# Patient Record
Sex: FEMALE | Race: WHITE | NOT HISPANIC OR LATINO | URBAN - METROPOLITAN AREA
[De-identification: names, ages, dates, MRNs, and addresses within clinical notes are randomized per-mention and may not be internally consistent; named-entity substitution may affect disease eponyms.]

---

## 2018-02-24 ENCOUNTER — EMERGENCY (EMERGENCY)
Facility: HOSPITAL | Age: 73
LOS: 1 days | Discharge: HOME | End: 2018-02-24
Attending: EMERGENCY MEDICINE | Admitting: EMERGENCY MEDICINE
Payer: MEDICARE

## 2018-02-24 VITALS
OXYGEN SATURATION: 99 % | RESPIRATION RATE: 16 BRPM | TEMPERATURE: 98 F | DIASTOLIC BLOOD PRESSURE: 72 MMHG | SYSTOLIC BLOOD PRESSURE: 145 MMHG | HEART RATE: 68 BPM

## 2018-02-24 VITALS
HEART RATE: 96 BPM | SYSTOLIC BLOOD PRESSURE: 151 MMHG | RESPIRATION RATE: 16 BRPM | HEIGHT: 60 IN | DIASTOLIC BLOOD PRESSURE: 76 MMHG | OXYGEN SATURATION: 99 % | WEIGHT: 203.93 LBS | TEMPERATURE: 97 F

## 2018-02-24 LAB
ALBUMIN SERPL ELPH-MCNC: 4.2 G/DL — SIGNIFICANT CHANGE UP (ref 3.3–5)
ALP SERPL-CCNC: 54 U/L — SIGNIFICANT CHANGE UP (ref 40–120)
ALT FLD-CCNC: 20 U/L RC — SIGNIFICANT CHANGE UP (ref 10–45)
ANION GAP SERPL CALC-SCNC: 14 MMOL/L — SIGNIFICANT CHANGE UP (ref 5–17)
APPEARANCE UR: CLEAR — SIGNIFICANT CHANGE UP
AST SERPL-CCNC: 23 U/L — SIGNIFICANT CHANGE UP (ref 10–40)
BACTERIA # UR AUTO: ABNORMAL /HPF
BASOPHILS # BLD AUTO: 0 K/UL — SIGNIFICANT CHANGE UP (ref 0–0.2)
BASOPHILS NFR BLD AUTO: 0.3 % — SIGNIFICANT CHANGE UP (ref 0–2)
BILIRUB SERPL-MCNC: 0.4 MG/DL — SIGNIFICANT CHANGE UP (ref 0.2–1.2)
BILIRUB UR-MCNC: NEGATIVE — SIGNIFICANT CHANGE UP
BUN SERPL-MCNC: 13 MG/DL — SIGNIFICANT CHANGE UP (ref 7–23)
CALCIUM SERPL-MCNC: 9.9 MG/DL — SIGNIFICANT CHANGE UP (ref 8.4–10.5)
CHLORIDE SERPL-SCNC: 102 MMOL/L — SIGNIFICANT CHANGE UP (ref 96–108)
CO2 SERPL-SCNC: 24 MMOL/L — SIGNIFICANT CHANGE UP (ref 22–31)
COLOR SPEC: SIGNIFICANT CHANGE UP
CREAT SERPL-MCNC: 0.83 MG/DL — SIGNIFICANT CHANGE UP (ref 0.5–1.3)
DIFF PNL FLD: NEGATIVE — SIGNIFICANT CHANGE UP
EOSINOPHIL # BLD AUTO: 0.1 K/UL — SIGNIFICANT CHANGE UP (ref 0–0.5)
EOSINOPHIL NFR BLD AUTO: 1.3 % — SIGNIFICANT CHANGE UP (ref 0–6)
EPI CELLS # UR: SIGNIFICANT CHANGE UP /HPF
GLUCOSE SERPL-MCNC: 94 MG/DL — SIGNIFICANT CHANGE UP (ref 70–99)
GLUCOSE UR QL: NEGATIVE — SIGNIFICANT CHANGE UP
HCT VFR BLD CALC: 40.2 % — SIGNIFICANT CHANGE UP (ref 34.5–45)
HGB BLD-MCNC: 14 G/DL — SIGNIFICANT CHANGE UP (ref 11.5–15.5)
KETONES UR-MCNC: NEGATIVE — SIGNIFICANT CHANGE UP
LEUKOCYTE ESTERASE UR-ACNC: NEGATIVE — SIGNIFICANT CHANGE UP
LIDOCAIN IGE QN: 19 U/L — SIGNIFICANT CHANGE UP (ref 7–60)
LYMPHOCYTES # BLD AUTO: 1.8 K/UL — SIGNIFICANT CHANGE UP (ref 1–3.3)
LYMPHOCYTES # BLD AUTO: 25.7 % — SIGNIFICANT CHANGE UP (ref 13–44)
MCHC RBC-ENTMCNC: 33.1 PG — SIGNIFICANT CHANGE UP (ref 27–34)
MCHC RBC-ENTMCNC: 34.8 GM/DL — SIGNIFICANT CHANGE UP (ref 32–36)
MCV RBC AUTO: 95.2 FL — SIGNIFICANT CHANGE UP (ref 80–100)
MONOCYTES # BLD AUTO: 0.5 K/UL — SIGNIFICANT CHANGE UP (ref 0–0.9)
MONOCYTES NFR BLD AUTO: 7.1 % — SIGNIFICANT CHANGE UP (ref 2–14)
NEUTROPHILS # BLD AUTO: 4.6 K/UL — SIGNIFICANT CHANGE UP (ref 1.8–7.4)
NEUTROPHILS NFR BLD AUTO: 65.5 % — SIGNIFICANT CHANGE UP (ref 43–77)
NITRITE UR-MCNC: NEGATIVE — SIGNIFICANT CHANGE UP
PH UR: 6.5 — SIGNIFICANT CHANGE UP (ref 5–8)
PLATELET # BLD AUTO: 285 K/UL — SIGNIFICANT CHANGE UP (ref 150–400)
POTASSIUM SERPL-MCNC: 4.6 MMOL/L — SIGNIFICANT CHANGE UP (ref 3.5–5.3)
POTASSIUM SERPL-SCNC: 4.6 MMOL/L — SIGNIFICANT CHANGE UP (ref 3.5–5.3)
PROT SERPL-MCNC: 7.7 G/DL — SIGNIFICANT CHANGE UP (ref 6–8.3)
PROT UR-MCNC: NEGATIVE — SIGNIFICANT CHANGE UP
RBC # BLD: 4.23 M/UL — SIGNIFICANT CHANGE UP (ref 3.8–5.2)
RBC # FLD: 11.7 % — SIGNIFICANT CHANGE UP (ref 10.3–14.5)
RBC CASTS # UR COMP ASSIST: SIGNIFICANT CHANGE UP /HPF (ref 0–2)
SODIUM SERPL-SCNC: 140 MMOL/L — SIGNIFICANT CHANGE UP (ref 135–145)
SP GR SPEC: >1.03 — HIGH (ref 1.01–1.02)
UROBILINOGEN FLD QL: NEGATIVE — SIGNIFICANT CHANGE UP
WBC # BLD: 7 K/UL — SIGNIFICANT CHANGE UP (ref 3.8–10.5)
WBC # FLD AUTO: 7 K/UL — SIGNIFICANT CHANGE UP (ref 3.8–10.5)
WBC UR QL: SIGNIFICANT CHANGE UP /HPF (ref 0–5)

## 2018-02-24 PROCEDURE — 74177 CT ABD & PELVIS W/CONTRAST: CPT | Mod: 26

## 2018-02-24 PROCEDURE — 74177 CT ABD & PELVIS W/CONTRAST: CPT

## 2018-02-24 PROCEDURE — 80053 COMPREHEN METABOLIC PANEL: CPT

## 2018-02-24 PROCEDURE — 99284 EMERGENCY DEPT VISIT MOD MDM: CPT | Mod: 25

## 2018-02-24 PROCEDURE — 85027 COMPLETE CBC AUTOMATED: CPT

## 2018-02-24 PROCEDURE — 76705 ECHO EXAM OF ABDOMEN: CPT | Mod: 26

## 2018-02-24 PROCEDURE — 93005 ELECTROCARDIOGRAM TRACING: CPT

## 2018-02-24 PROCEDURE — 83690 ASSAY OF LIPASE: CPT

## 2018-02-24 PROCEDURE — 81001 URINALYSIS AUTO W/SCOPE: CPT

## 2018-02-24 PROCEDURE — 99284 EMERGENCY DEPT VISIT MOD MDM: CPT

## 2018-02-24 PROCEDURE — 76705 ECHO EXAM OF ABDOMEN: CPT

## 2018-02-24 PROCEDURE — 96374 THER/PROPH/DIAG INJ IV PUSH: CPT

## 2018-02-24 RX ORDER — ACETAMINOPHEN 500 MG
1000 TABLET ORAL ONCE
Qty: 0 | Refills: 0 | Status: COMPLETED | OUTPATIENT
Start: 2018-02-24 | End: 2018-02-24

## 2018-02-24 RX ORDER — SODIUM CHLORIDE 9 MG/ML
1000 INJECTION INTRAMUSCULAR; INTRAVENOUS; SUBCUTANEOUS ONCE
Qty: 0 | Refills: 0 | Status: COMPLETED | OUTPATIENT
Start: 2018-02-24 | End: 2018-02-24

## 2018-02-24 RX ADMIN — Medication 1000 MILLIGRAM(S): at 18:55

## 2018-02-24 RX ADMIN — Medication 400 MILLIGRAM(S): at 17:17

## 2018-02-24 RX ADMIN — SODIUM CHLORIDE 1000 MILLILITER(S): 9 INJECTION INTRAMUSCULAR; INTRAVENOUS; SUBCUTANEOUS at 17:17

## 2018-02-24 NOTE — ED PROVIDER NOTE - PROGRESS NOTE DETAILS
Pt signed out to me by Dr. He pending results of ct scan. pt describes resolution of abdominal pain at this time. ct scan is remarkable for left adrenal nodules that require no emergent treatment at this time. recommendation given to pt and sister for follow up with primary care doctor for outpatient MRI. UA is pending. will follow up with pt and d/c home following arrival of ua. Pt signed out to me by Dr. He pending results of ct scan. pt describes resolution of abdominal pain at this time. ct scan is remarkable for left adrenal nodules that require no emergent treatment at this time. recommendation given to pt and sister for follow up with primary care doctor for outpatient MRI. UA is pending. will follow up with pt and d/c home following arrival of ua.  -Dr. Rosales

## 2018-02-24 NOTE — ED PROVIDER NOTE - MEDICAL DECISION MAKING DETAILS
eval for cholelithiasis/cholecystitis with RUQ, however, given pt reports diffuse pain and recently weight loss will additionally pursue CT for alternate dx.  obtain labs including LFTs, lipase.  pt ttp with +murrell's less likely cardiac etiology, will obtain ekg.  analgesia, reassess

## 2018-02-24 NOTE — ED ADULT NURSE NOTE - OBJECTIVE STATEMENT
72 year old female presents to ED c/o abd pain x 1 month, went to PMD and u/s showed gall bladder stones. Patient states she has decreased appetite.  Lung sounds clear. Abd slightly distended tender to RUQ. Skin warm dry intact. Family at bedside. in no acute distress.

## 2018-02-24 NOTE — ED ADULT NURSE REASSESSMENT NOTE - NS ED NURSE REASSESS COMMENT FT1
1900: report received from diana victoria. pt pending urine sample and ct results for dispo. pt denies abd pain/n/v/dysuria/numbness/tingling/chest pain/sob. abd nondistended, nontender. aox3. family at bedside. safety maintained. will continue to monitor.

## 2018-02-24 NOTE — ED PROVIDER NOTE - OBJECTIVE STATEMENT
72 year old female with pmhx of Hypertension, hyperlipidemia presents from PMD's office with epigastric, RUQ abdominal pain. Currently associated with nausea.  Recently diagnosed with cholelithiasis  Post-cranial. Has multiple gall stones seen on abdominal sonogram. Has chills but no fever. LNBM this morning. Per patient, last night noted slight blood in stools when 72 year old female with pmhx of Hypertension, hyperlipidemia presents from PMD's office with diffuse abdominal pain worse in epigastric, RUQ abdominal pain for weeks. Currently associated with nausea.  Recently diagnosed with cholelithiasis  Postprandial. Has multiple gall stones seen on abdominal sonogram from 3 weeks ago. Sent by PMD to r/o cholecystitis.  Has chills but no fever. BM this morning, no melena.  Sister notes recent weight loss.

## 2019-01-14 PROBLEM — Z00.00 ENCOUNTER FOR PREVENTIVE HEALTH EXAMINATION: Status: ACTIVE | Noted: 2019-01-14

## 2019-02-13 ENCOUNTER — APPOINTMENT (OUTPATIENT)
Dept: VASCULAR SURGERY | Facility: CLINIC | Age: 74
End: 2019-02-13
Payer: MEDICARE

## 2019-02-13 VITALS
SYSTOLIC BLOOD PRESSURE: 137 MMHG | HEIGHT: 61 IN | DIASTOLIC BLOOD PRESSURE: 82 MMHG | WEIGHT: 208 LBS | TEMPERATURE: 99.8 F | BODY MASS INDEX: 39.27 KG/M2

## 2019-02-13 PROCEDURE — 99204 OFFICE O/P NEW MOD 45 MIN: CPT

## 2019-02-13 PROCEDURE — 93923 UPR/LXTR ART STDY 3+ LVLS: CPT

## 2019-02-13 RX ORDER — CILOSTAZOL 50 MG/1
50 TABLET ORAL
Qty: 60 | Refills: 6 | Status: ACTIVE | COMMUNITY
Start: 2019-02-13 | End: 1900-01-01

## 2019-02-13 RX ORDER — VITAMIN B COMPLEX
CAPSULE ORAL
Refills: 0 | Status: ACTIVE | COMMUNITY

## 2019-02-13 RX ORDER — NABUMETONE 500 MG/1
500 TABLET, FILM COATED ORAL
Refills: 0 | Status: ACTIVE | COMMUNITY

## 2019-02-13 RX ORDER — UBIDECARENONE 50 MG
CAPSULE ORAL
Refills: 0 | Status: ACTIVE | COMMUNITY

## 2019-02-13 RX ORDER — OMEPRAZOLE 40 MG/1
40 CAPSULE, DELAYED RELEASE ORAL
Refills: 0 | Status: ACTIVE | COMMUNITY

## 2019-02-13 RX ORDER — SIMVASTATIN 10 MG/1
10 TABLET, FILM COATED ORAL
Refills: 0 | Status: ACTIVE | COMMUNITY

## 2019-02-13 RX ORDER — CALCIUM CARBONATE 500(1250)
500 TABLET ORAL
Refills: 0 | Status: ACTIVE | COMMUNITY

## 2019-02-13 NOTE — DATA REVIEWED
[FreeTextEntry1] : 2/13/2019 BARTOLOME/PVR RLE mild infra geniculate and LLE mild infra geniculate arterial insuff w vessel calcification\par                                 Rt BARTOLOME  1.22 Lt BARTOLOME  1.06\par                                 \par

## 2019-02-13 NOTE — PHYSICAL EXAM
[Normal Breath Sounds] : Normal breath sounds [2+] : left 2+ [1+] : left 1+ [Ankle Swelling (On Exam)] : present [Ankle Swelling Bilaterally] : bilaterally  [Varicose Veins Of Lower Extremities] : bilaterally [] : bilaterally [Ankle Swelling On The Right] : mild [No HSM] : no hepatosplenomegaly [No Rash or Lesion] : No rash or lesion [Alert] : alert [Oriented to Person] : oriented to person [Oriented to Place] : oriented to place [Oriented to Time] : oriented to time [Calm] : calm [JVD] : no jugular venous distention  [Right Carotid Bruit] : no bruit heard over the right carotid [Left Carotid Bruit] : no bruit heard over the left carotid [Abdomen Masses] : No abdominal masses [Tender] : was nontender [Stool Sample Taken] : No stool obtained  on rectal exam [de-identified] : nad [de-identified] : wnl [FreeTextEntry1] : Mod arterial insufficiency w moderate trophic skin changes \par no wounds/ulcers\par Mild bilateral leg venous insufficiency \par w mild bilateral leg stasis dermatitis \par and mild to moderate bilateral leg edema \par Multiple  bilateral leg small varicose  veins and spider veins calf and shin \par no wounds/ulcers\par  [de-identified] : wnl [de-identified] : wnl [de-identified] : Brian Cranial nerves 2-12 brian grossly intact [de-identified] : cooperative

## 2019-02-13 NOTE — HISTORY OF PRESENT ILLNESS
[FreeTextEntry1] : Pt c/o  bilateral  leg alessandra calf  intermittent claudication at  1-2  blocks and nightly  sherry leg and foot nocturnal cramps sev times per night and it affects her sleep\par Onset sev years w recent worsening \par Intensity moderate \par pt states that the intermittent claudication is affecting her activities of daily living \par Pt denies recent injury, travel\par \par \par \par

## 2019-02-13 NOTE — ASSESSMENT
[FreeTextEntry1] : Impression symptomatic arterial insuff  and  asymptomatic venous insuff \par \par \par Plan Med Conservative management leg elevation, exercise program, protective measures\par trial pletal 50 bid \par ov w suresh/pvr s/o art insuff 12mo feb 2020\par ov 4-6 weeks to re eval \par \par Letter faxed to Dr TRENT Bueno MD \par  [Arterial/Venous Disease] : arterial/venous disease [Medication Management] : medication management

## 2019-02-18 ENCOUNTER — TRANSCRIPTION ENCOUNTER (OUTPATIENT)
Age: 74
End: 2019-02-18

## 2019-03-04 ENCOUNTER — TRANSCRIPTION ENCOUNTER (OUTPATIENT)
Age: 74
End: 2019-03-04

## 2019-03-27 ENCOUNTER — APPOINTMENT (OUTPATIENT)
Dept: VASCULAR SURGERY | Facility: CLINIC | Age: 74
End: 2019-03-27
Payer: MEDICARE

## 2019-03-27 VITALS
HEIGHT: 60 IN | DIASTOLIC BLOOD PRESSURE: 83 MMHG | TEMPERATURE: 98.9 F | WEIGHT: 208 LBS | BODY MASS INDEX: 40.84 KG/M2 | HEART RATE: 91 BPM | SYSTOLIC BLOOD PRESSURE: 147 MMHG

## 2019-03-27 DIAGNOSIS — I73.9 PERIPHERAL VASCULAR DISEASE, UNSPECIFIED: ICD-10-CM

## 2019-03-27 DIAGNOSIS — R25.2 CRAMP AND SPASM: ICD-10-CM

## 2019-03-27 DIAGNOSIS — I87.2 VENOUS INSUFFICIENCY (CHRONIC) (PERIPHERAL): ICD-10-CM

## 2019-03-27 DIAGNOSIS — G47.62 SLEEP RELATED LEG CRAMPS: ICD-10-CM

## 2019-03-27 DIAGNOSIS — I77.1 STRICTURE OF ARTERY: ICD-10-CM

## 2019-03-27 PROCEDURE — 99214 OFFICE O/P EST MOD 30 MIN: CPT

## 2019-03-27 RX ORDER — CILOSTAZOL 50 MG/1
50 TABLET ORAL
Qty: 180 | Refills: 3 | Status: ACTIVE | COMMUNITY
Start: 2019-03-27 | End: 1900-01-01

## 2019-03-27 NOTE — HISTORY OF PRESENT ILLNESS
[FreeTextEntry1] : Pt c/o  bilateral  leg intermittent claudication at  1-2  blocks and nightly  sherry leg and foot nocturnal cramps sev times per night but overall has more strength and  is able to sleep better alessandra since last ov \par Intensity moderate \par pt is on pletal 50 bid\par pt is planning to travel to [place]  may to sept 2019 \par \par \par

## 2019-03-27 NOTE — ASSESSMENT
[Arterial/Venous Disease] : arterial/venous disease [Medication Management] : medication management [FreeTextEntry1] : Impression symptomatic arterial insuff  w clinical improvement and  asymptomatic venous insuff \par \par \par Plan Med Conservative management leg elevation, exercise program, protective measures\par continue pletal 50 bid \par ov w suresh/pvr s/o art insuff 12mo feb 2020\par ov oct 2019 prn \par \par Letter faxed to Dr TRENT Bueno MD \par

## 2019-03-27 NOTE — PHYSICAL EXAM
[Normal Breath Sounds] : Normal breath sounds [2+] : left 2+ [1+] : left 1+ [Ankle Swelling (On Exam)] : present [Ankle Swelling Bilaterally] : bilaterally  [Varicose Veins Of Lower Extremities] : bilaterally [] : bilaterally [Ankle Swelling On The Right] : mild [No HSM] : no hepatosplenomegaly [No Rash or Lesion] : No rash or lesion [Alert] : alert [Oriented to Person] : oriented to person [Oriented to Place] : oriented to place [Oriented to Time] : oriented to time [Calm] : calm [JVD] : no jugular venous distention  [Right Carotid Bruit] : no bruit heard over the right carotid [Left Carotid Bruit] : no bruit heard over the left carotid [Abdomen Masses] : No abdominal masses [Tender] : was nontender [Stool Sample Taken] : No stool obtained  on rectal exam [de-identified] : nad [de-identified] : wnl [FreeTextEntry1] : Mod arterial insufficiency w moderate trophic skin changes \par no wounds/ulcers\par Mild bilateral leg venous insufficiency \par w mild bilateral leg stasis dermatitis \par and mild to moderate bilateral leg edema \par Multiple  bilateral leg small varicose  veins and spider veins calf and shin \par no wounds/ulcers\par  [de-identified] : wnl [de-identified] : wnl [de-identified] : Brian Cranial nerves 2-12 brian grossly intact [de-identified] : cooperative

## 2019-03-27 NOTE — DATA REVIEWED
[FreeTextEntry1] : 2/13/2019 BARTOLOME/PVR RLE mild infra geniculate and LLE mild infra geniculate arterial insuff w vessel calcification\par                                 Rt BARTOLMOE  1.22 Lt BARTOLOME  1.06\par                                 \par

## 2019-10-30 ENCOUNTER — APPOINTMENT (OUTPATIENT)
Dept: VASCULAR SURGERY | Facility: CLINIC | Age: 74
End: 2019-10-30

## 2019-12-18 ENCOUNTER — APPOINTMENT (OUTPATIENT)
Dept: VASCULAR SURGERY | Facility: CLINIC | Age: 74
End: 2019-12-18

## 2020-01-28 ENCOUNTER — OUTPATIENT (OUTPATIENT)
Dept: OUTPATIENT SERVICES | Facility: HOSPITAL | Age: 75
LOS: 1 days | End: 2020-01-28
Payer: MEDICARE

## 2020-01-28 VITALS
OXYGEN SATURATION: 97 % | WEIGHT: 195.99 LBS | HEART RATE: 82 BPM | HEIGHT: 58.5 IN | TEMPERATURE: 99 F | RESPIRATION RATE: 16 BRPM | SYSTOLIC BLOOD PRESSURE: 142 MMHG | DIASTOLIC BLOOD PRESSURE: 88 MMHG

## 2020-01-28 DIAGNOSIS — M17.11 UNILATERAL PRIMARY OSTEOARTHRITIS, RIGHT KNEE: ICD-10-CM

## 2020-01-28 DIAGNOSIS — Z98.890 OTHER SPECIFIED POSTPROCEDURAL STATES: Chronic | ICD-10-CM

## 2020-01-28 DIAGNOSIS — Z29.9 ENCOUNTER FOR PROPHYLACTIC MEASURES, UNSPECIFIED: ICD-10-CM

## 2020-01-28 DIAGNOSIS — Z01.818 ENCOUNTER FOR OTHER PREPROCEDURAL EXAMINATION: ICD-10-CM

## 2020-01-28 DIAGNOSIS — M19.90 UNSPECIFIED OSTEOARTHRITIS, UNSPECIFIED SITE: ICD-10-CM

## 2020-01-28 LAB
ANION GAP SERPL CALC-SCNC: 13 MMOL/L — SIGNIFICANT CHANGE UP (ref 5–17)
BLD GP AB SCN SERPL QL: NEGATIVE — SIGNIFICANT CHANGE UP
BUN SERPL-MCNC: 14 MG/DL — SIGNIFICANT CHANGE UP (ref 7–23)
CALCIUM SERPL-MCNC: 10.2 MG/DL — SIGNIFICANT CHANGE UP (ref 8.4–10.5)
CHLORIDE SERPL-SCNC: 102 MMOL/L — SIGNIFICANT CHANGE UP (ref 96–108)
CO2 SERPL-SCNC: 25 MMOL/L — SIGNIFICANT CHANGE UP (ref 22–31)
CREAT SERPL-MCNC: 0.72 MG/DL — SIGNIFICANT CHANGE UP (ref 0.5–1.3)
GLUCOSE SERPL-MCNC: 112 MG/DL — HIGH (ref 70–99)
HBA1C BLD-MCNC: 5.7 % — HIGH (ref 4–5.6)
HCT VFR BLD CALC: 44.4 % — SIGNIFICANT CHANGE UP (ref 34.5–45)
HGB BLD-MCNC: 14.5 G/DL — SIGNIFICANT CHANGE UP (ref 11.5–15.5)
MCHC RBC-ENTMCNC: 31.4 PG — SIGNIFICANT CHANGE UP (ref 27–34)
MCHC RBC-ENTMCNC: 32.7 GM/DL — SIGNIFICANT CHANGE UP (ref 32–36)
MCV RBC AUTO: 96.1 FL — SIGNIFICANT CHANGE UP (ref 80–100)
MRSA PCR RESULT.: SIGNIFICANT CHANGE UP
PLATELET # BLD AUTO: 275 K/UL — SIGNIFICANT CHANGE UP (ref 150–400)
POTASSIUM SERPL-MCNC: 4.5 MMOL/L — SIGNIFICANT CHANGE UP (ref 3.5–5.3)
POTASSIUM SERPL-SCNC: 4.5 MMOL/L — SIGNIFICANT CHANGE UP (ref 3.5–5.3)
RBC # BLD: 4.62 M/UL — SIGNIFICANT CHANGE UP (ref 3.8–5.2)
RBC # FLD: 12.6 % — SIGNIFICANT CHANGE UP (ref 10.3–14.5)
RH IG SCN BLD-IMP: POSITIVE — SIGNIFICANT CHANGE UP
S AUREUS DNA NOSE QL NAA+PROBE: SIGNIFICANT CHANGE UP
SODIUM SERPL-SCNC: 140 MMOL/L — SIGNIFICANT CHANGE UP (ref 135–145)
WBC # BLD: 5.69 K/UL — SIGNIFICANT CHANGE UP (ref 3.8–10.5)
WBC # FLD AUTO: 5.69 K/UL — SIGNIFICANT CHANGE UP (ref 3.8–10.5)

## 2020-01-28 PROCEDURE — 80048 BASIC METABOLIC PNL TOTAL CA: CPT

## 2020-01-28 PROCEDURE — 85027 COMPLETE CBC AUTOMATED: CPT

## 2020-01-28 PROCEDURE — 86901 BLOOD TYPING SEROLOGIC RH(D): CPT

## 2020-01-28 PROCEDURE — 87640 STAPH A DNA AMP PROBE: CPT

## 2020-01-28 PROCEDURE — 86850 RBC ANTIBODY SCREEN: CPT

## 2020-01-28 PROCEDURE — 83036 HEMOGLOBIN GLYCOSYLATED A1C: CPT

## 2020-01-28 PROCEDURE — 87641 MR-STAPH DNA AMP PROBE: CPT

## 2020-01-28 PROCEDURE — G0463: CPT

## 2020-01-28 PROCEDURE — 86900 BLOOD TYPING SEROLOGIC ABO: CPT

## 2020-01-28 RX ORDER — SODIUM CHLORIDE 9 MG/ML
3 INJECTION INTRAMUSCULAR; INTRAVENOUS; SUBCUTANEOUS EVERY 8 HOURS
Refills: 0 | Status: DISCONTINUED | OUTPATIENT
Start: 2020-02-11 | End: 2020-02-11

## 2020-01-28 RX ORDER — TRAMADOL HYDROCHLORIDE 50 MG/1
50 TABLET ORAL ONCE
Refills: 0 | Status: DISCONTINUED | OUTPATIENT
Start: 2020-02-11 | End: 2020-02-11

## 2020-01-28 RX ORDER — CEFAZOLIN SODIUM 1 G
2000 VIAL (EA) INJECTION ONCE
Refills: 0 | Status: DISCONTINUED | OUTPATIENT
Start: 2020-02-11 | End: 2020-02-13

## 2020-01-28 NOTE — H&P PST ADULT - COMFORT LEVEL, ACCEPTABLE
-- Message is from the [facility]--    Reason for Call: requesting to  a copy of the last physical ( November 2018) and the vaccine records for school.     Caller Information       Type Contact Phone    08/26/2019 12:55 PM Phone (Incoming) Corinne Dumont (Mother) [phone]          Alternative phone number: none    Turnaround time given to caller:   \"This message will be sent to [state Provider's name]. The clinical team will fulfill your request as soon as they review your message.\"     0

## 2020-01-28 NOTE — H&P PST ADULT - HISTORY OF PRESENT ILLNESS
This is a 74 year old female, * Amharic speaking only,  translation by sister accompanying her. PMH:  former Smoker: 0.5 ppd X 20 years: quit '00, Morbid Obesity: current BMI 40.3,  HLD, GERD, Intermittent Claudication, Osteoarthritis This is a 74 year old female, * Upper sorbian speaking only,  translation by sister accompanying her. PMH:  former Smoker: 0.5 ppd X 20 years: quit '00, Morbid Obesity: current BMI 40.3,  + Adrenal Mass: incidental finding during evaluation of Acute Diverticulitis 2/2018: followed yearly by endocrinologist: assymptomatic; No surgery, HLD, GERD, Intermittent Claudication; non-compliant with Cilostazol as prescribed, Osteoarthritis.  Current c/o severe bilateral Knee pain. Now scheduled ( 2-11-20): Right Total Knee replacement using JEFFREY Robot. This is a 74 year old female, * Kinyarwanda speaking only,  translation by sister accompanying her. PMH:  former Smoker: 0.5 ppd X 20 years: quit '00, Morbid Obesity: current BMI 40.3,  + Adrenal Mass: incidental finding during evaluation of Acute Diverticulitis 2/2018: followed yearly by endocrinologist: assymptomatic; No surgery, HLD, GERD, Intermittent Vertigo: Meclizine prn,  Intermittent Claudication; non-compliant with Cilostazol as prescribed, Osteoarthritis.  Current c/o severe bilateral Knee pain. Now scheduled ( 2-11-20): Right Total Knee replacement using JEFFREY Robot.

## 2020-01-28 NOTE — H&P PST ADULT - NSICDXPROBLEM_GEN_ALL_CORE_FT
PROBLEM DIAGNOSES  Problem: Osteoarthritis  Assessment and Plan: Right total knee Replacement using JEFFREY Robot  * preop Medical evaluation scheduled 2-1-20 with PCP.     Problem: Need for prophylactic measure  Assessment and Plan: The Caprini score indicates that this patient is at high risk for a VTE event (score 6 or greater). Surgical patients in this group will benefit from both pharmacologic prophylaxis and intermittent compression devices.  The surgical team will determine the balance between VTE risk and bleeding risk, and other clinical considerations

## 2020-01-28 NOTE — H&P PST ADULT - NSICDXPASTMEDICALHX_GEN_ALL_CORE_FT
PAST MEDICAL HISTORY:  Acute diverticulitis 2/2018    medically managed; No surgery    HTN (hypertension)     Vertigo PAST MEDICAL HISTORY:  Acute diverticulitis 2/2018    medically managed; No surgery    Carpal tunnel syndrome '90's  Bilateral:  surgically treated    Diverticulosis     GERD (gastroesophageal reflux disease)     HLD (hyperlipidemia)     Intermittent claudication Evaluated. Cilostazol  prescribed: non-compliant    Left adrenal mass dx: 2/2018    Incidental finding during evaluation o Acute Diverticulitis. Evaluated by Endocrinologist: followup yearly. No surgery    Morbid obesity with BMI of 40.0-44.9, adult     Multiparity     Vertigo intermittent.  Evaluated: meclizine prn PAST MEDICAL HISTORY:  Acute diverticulitis 2/2018    medically managed; No surgery    Carpal tunnel syndrome '90's  Bilateral:  surgically treated    Diverticulosis     GERD (gastroesophageal reflux disease)     Heart murmur mild    HLD (hyperlipidemia)     Intermittent claudication Evaluated. Cilostazol  prescribed: non-compliant    Left adrenal mass dx: 2/2018    Incidental finding during evaluation of Acute Diverticulitis.  Assymptomatic. Evaluated by Endocrinologist: followup yearly. No surgery.    Morbid obesity with BMI of 40.0-44.9, adult     Multiparity     Osteoarthritis     Vertigo intermittent.  Evaluated: meclizine prn

## 2020-01-28 NOTE — H&P PST ADULT - OTHER CARE PROVIDERS
Thiago Burnett, cardiologist ( [phone] Thiago Burnett, cardiologist ( [phone]         Scot Ford, endocrinologist [phone]

## 2020-01-28 NOTE — H&P PST ADULT - REASON FOR ADMISSION
" I have severe Arthrirtis in my Right Knee. There's more cartilage" " I have severe Arthrirtis in my Right Knee. There's no cartilage"

## 2020-01-28 NOTE — H&P PST ADULT - NSICDXPASTSURGICALHX_GEN_ALL_CORE_FT
PAST SURGICAL HISTORY:  No significant past surgical history PAST SURGICAL HISTORY:  S/P carpal tunnel release '90's  Bilateral    S/P colonoscopy 4/2019     Negative    S/P endoscopy '2019

## 2020-01-28 NOTE — H&P PST ADULT - ASSESSMENT
JOLLYI VTE 2.0 SCORE [CLOT updated 2019]    AGE RELATED RISK FACTORS                                                       MOBILITY RELATED FACTORS  [ ] Age 41-60 years                                            (1 Point)                    [ ] Bed rest                                                        (1 Point)  [ x] Age: 61-74 years                                           (2 Points)                  [ ] Plaster cast                                                   (2 Points)  [ ] Age= 75 years                                              (3 Points)                    [ ] Bed bound for more than 72 hours                 (2 Points)    DISEASE RELATED RISK FACTORS                                               GENDER SPECIFIC FACTORS  [ ] Edema in the lower extremities                       (1 Point)              [ ] Pregnancy                                                     (1 Point)  [ ] Varicose veins                                               (1 Point)                     [ ] Post-partum < 6 weeks                                   (1 Point)             [ x] BMI > 25 Kg/m2                                            (1 Point)                     [ ] Hormonal therapy  or oral contraception          (1 Point)                 [ ] Sepsis (in the previous month)                        (1 Point)               [ ] History of pregnancy complications                 (1 point)  [ ] Pneumonia or serious lung disease                                               [ ] Unexplained or recurrent                     (1 Point)           (in the previous month)                               (1 Point)  [ ] Abnormal pulmonary function test                     (1 Point)                 SURGERY RELATED RISK FACTORS  [ ] Acute myocardial infarction                              (1 Point)               [ ]  Section                                             (1 Point)  [ ] Congestive heart failure (in the previous month)  (1 Point)      [ ] Minor surgery                                                  (1 Point)   [ ] Inflammatory bowel disease                             (1 Point)               [ ] Arthroscopic surgery                                        (2 Points)  [ ] Central venous access                                      (2 Points)                [ ] General surgery lasting more than 45 minutes (2 points)  [ ] Malignancy- Present or previous                   (2 Points)                [ x] Elective arthroplasty                                         (5 points)    [ ] Stroke (in the previous month)                          (5 Points)                                                                                                                                                           HEMATOLOGY RELATED FACTORS                                                 TRAUMA RELATED RISK FACTORS  [ ] Prior episodes of VTE                                     (3 Points)                [ ] Fracture of the hip, pelvis, or leg                       (5 Points)  [ ] Positive family history for VTE                         (3 Points)             [ ] Acute spinal cord injury (in the previous month)  (5 Points)  [ ] Prothrombin 94997 A                                     (3 Points)               [ ] Paralysis  (less than 1 month)                             (5 Points)  [ ] Factor V Leiden                                             (3 Points)                  [ ] Multiple Trauma within 1 month                        (5 Points)  [ ] Lupus anticoagulants                                     (3 Points)                                                           [ ] Anticardiolipin antibodies                               (3 Points)                                                       [ ] High homocysteine in the blood                      (3 Points)                                             [ ] Other congenital or acquired thrombophilia      (3 Points)                                                [ ] Heparin induced thrombocytopenia                  (3 Points)                                     Total Score [        8  ]

## 2020-01-28 NOTE — H&P PST ADULT - NSICDXNOPASTSURGICALHX_GEN_ALL_CORE
<-- Click to add NO significant Past Surgical History report given to YULY Castro report given to YULY Castro on 6 [facility] room 620a in stable condition for continuation of care. pt a&ox3, admitted for left leg cellulitis. ambulatory with pain, was in ED 3 days ago and given oral abx, came back because of worsening swelling and pain. 20G RAC.

## 2020-01-28 NOTE — H&P PST ADULT - NSICDXFAMILYHX_GEN_ALL_CORE_FT
FAMILY HISTORY:  No pertinent family history in first degree relatives FAMILY HISTORY:  Family history of breast cancer in sister  Family history of colon cancer, Sister  Family history of colon cancer in mother  Family history of liver cancer, Sister  Family history of throat cancer, Father

## 2020-02-01 ENCOUNTER — OUTPATIENT (OUTPATIENT)
Dept: OUTPATIENT SERVICES | Facility: HOSPITAL | Age: 75
LOS: 1 days | End: 2020-02-01
Payer: MEDICARE

## 2020-02-01 DIAGNOSIS — Z98.890 OTHER SPECIFIED POSTPROCEDURAL STATES: Chronic | ICD-10-CM

## 2020-02-11 ENCOUNTER — INPATIENT (INPATIENT)
Facility: HOSPITAL | Age: 75
LOS: 1 days | Discharge: HOME | DRG: 470 | End: 2020-02-13
Attending: ORTHOPAEDIC SURGERY | Admitting: ORTHOPAEDIC SURGERY
Payer: MEDICARE

## 2020-02-11 VITALS
DIASTOLIC BLOOD PRESSURE: 83 MMHG | SYSTOLIC BLOOD PRESSURE: 143 MMHG | TEMPERATURE: 98 F | RESPIRATION RATE: 17 BRPM | HEIGHT: 58.5 IN | WEIGHT: 195.99 LBS | OXYGEN SATURATION: 97 % | HEART RATE: 85 BPM

## 2020-02-11 DIAGNOSIS — Z98.890 OTHER SPECIFIED POSTPROCEDURAL STATES: Chronic | ICD-10-CM

## 2020-02-11 DIAGNOSIS — M17.11 UNILATERAL PRIMARY OSTEOARTHRITIS, RIGHT KNEE: ICD-10-CM

## 2020-02-11 LAB
GLUCOSE BLDC GLUCOMTR-MCNC: 89 MG/DL — SIGNIFICANT CHANGE UP (ref 70–99)
RH IG SCN BLD-IMP: POSITIVE — SIGNIFICANT CHANGE UP

## 2020-02-11 PROCEDURE — 73560 X-RAY EXAM OF KNEE 1 OR 2: CPT | Mod: 26,RT

## 2020-02-11 RX ORDER — TRAMADOL HYDROCHLORIDE 50 MG/1
50 TABLET ORAL EVERY 6 HOURS
Refills: 0 | Status: DISCONTINUED | OUTPATIENT
Start: 2020-02-11 | End: 2020-02-13

## 2020-02-11 RX ORDER — SIMVASTATIN 20 MG/1
10 TABLET, FILM COATED ORAL AT BEDTIME
Refills: 0 | Status: DISCONTINUED | OUTPATIENT
Start: 2020-02-11 | End: 2020-02-13

## 2020-02-11 RX ORDER — ACETAMINOPHEN 500 MG
1000 TABLET ORAL ONCE
Refills: 0 | Status: COMPLETED | OUTPATIENT
Start: 2020-02-11 | End: 2020-02-11

## 2020-02-11 RX ORDER — DEXAMETHASONE 0.5 MG/5ML
8 ELIXIR ORAL ONCE
Refills: 0 | Status: DISCONTINUED | OUTPATIENT
Start: 2020-02-12 | End: 2020-02-12

## 2020-02-11 RX ORDER — KETOROLAC TROMETHAMINE 30 MG/ML
15 SYRINGE (ML) INJECTION EVERY 6 HOURS
Refills: 0 | Status: COMPLETED | OUTPATIENT
Start: 2020-02-11 | End: 2020-02-13

## 2020-02-11 RX ORDER — SENNA PLUS 8.6 MG/1
2 TABLET ORAL AT BEDTIME
Refills: 0 | Status: DISCONTINUED | OUTPATIENT
Start: 2020-02-11 | End: 2020-02-13

## 2020-02-11 RX ORDER — OXYCODONE HYDROCHLORIDE 5 MG/1
10 TABLET ORAL EVERY 4 HOURS
Refills: 0 | Status: DISCONTINUED | OUTPATIENT
Start: 2020-02-11 | End: 2020-02-13

## 2020-02-11 RX ORDER — ACETAMINOPHEN 500 MG
1000 TABLET ORAL ONCE
Refills: 0 | Status: COMPLETED | OUTPATIENT
Start: 2020-02-12 | End: 2020-02-12

## 2020-02-11 RX ORDER — POLYETHYLENE GLYCOL 3350 17 G/17G
17 POWDER, FOR SOLUTION ORAL DAILY
Refills: 0 | Status: DISCONTINUED | OUTPATIENT
Start: 2020-02-11 | End: 2020-02-13

## 2020-02-11 RX ORDER — CHLORHEXIDINE GLUCONATE 213 G/1000ML
1 SOLUTION TOPICAL ONCE
Refills: 0 | Status: COMPLETED | OUTPATIENT
Start: 2020-02-11 | End: 2020-02-11

## 2020-02-11 RX ORDER — LIDOCAINE HCL 20 MG/ML
0.2 VIAL (ML) INJECTION ONCE
Refills: 0 | Status: COMPLETED | OUTPATIENT
Start: 2020-02-11 | End: 2020-02-11

## 2020-02-11 RX ORDER — ASPIRIN/CALCIUM CARB/MAGNESIUM 324 MG
81 TABLET ORAL
Refills: 0 | Status: DISCONTINUED | OUTPATIENT
Start: 2020-02-11 | End: 2020-02-13

## 2020-02-11 RX ORDER — CELECOXIB 200 MG/1
200 CAPSULE ORAL DAILY
Refills: 0 | Status: DISCONTINUED | OUTPATIENT
Start: 2020-02-13 | End: 2020-02-13

## 2020-02-11 RX ORDER — MAGNESIUM HYDROXIDE 400 MG/1
30 TABLET, CHEWABLE ORAL DAILY
Refills: 0 | Status: DISCONTINUED | OUTPATIENT
Start: 2020-02-11 | End: 2020-02-13

## 2020-02-11 RX ORDER — CEFAZOLIN SODIUM 1 G
2000 VIAL (EA) INJECTION EVERY 8 HOURS
Refills: 0 | Status: COMPLETED | OUTPATIENT
Start: 2020-02-11 | End: 2020-02-12

## 2020-02-11 RX ORDER — PANTOPRAZOLE SODIUM 20 MG/1
40 TABLET, DELAYED RELEASE ORAL ONCE
Refills: 0 | Status: COMPLETED | OUTPATIENT
Start: 2020-02-11 | End: 2020-02-11

## 2020-02-11 RX ORDER — SODIUM CHLORIDE 9 MG/ML
500 INJECTION INTRAMUSCULAR; INTRAVENOUS; SUBCUTANEOUS ONCE
Refills: 0 | Status: COMPLETED | OUTPATIENT
Start: 2020-02-11 | End: 2020-02-11

## 2020-02-11 RX ORDER — PANTOPRAZOLE SODIUM 20 MG/1
40 TABLET, DELAYED RELEASE ORAL
Refills: 0 | Status: DISCONTINUED | OUTPATIENT
Start: 2020-02-11 | End: 2020-02-13

## 2020-02-11 RX ORDER — GABAPENTIN 400 MG/1
100 CAPSULE ORAL ONCE
Refills: 0 | Status: COMPLETED | OUTPATIENT
Start: 2020-02-11 | End: 2020-02-11

## 2020-02-11 RX ORDER — HYDROMORPHONE HYDROCHLORIDE 2 MG/ML
0.5 INJECTION INTRAMUSCULAR; INTRAVENOUS; SUBCUTANEOUS
Refills: 0 | Status: DISCONTINUED | OUTPATIENT
Start: 2020-02-11 | End: 2020-02-11

## 2020-02-11 RX ORDER — ACETAMINOPHEN 500 MG
975 TABLET ORAL EVERY 8 HOURS
Refills: 0 | Status: DISCONTINUED | OUTPATIENT
Start: 2020-02-12 | End: 2020-02-13

## 2020-02-11 RX ORDER — SODIUM CHLORIDE 9 MG/ML
1000 INJECTION INTRAMUSCULAR; INTRAVENOUS; SUBCUTANEOUS
Refills: 0 | Status: DISCONTINUED | OUTPATIENT
Start: 2020-02-11 | End: 2020-02-13

## 2020-02-11 RX ORDER — ONDANSETRON 8 MG/1
4 TABLET, FILM COATED ORAL ONCE
Refills: 0 | Status: DISCONTINUED | OUTPATIENT
Start: 2020-02-11 | End: 2020-02-11

## 2020-02-11 RX ORDER — CELECOXIB 200 MG/1
200 CAPSULE ORAL ONCE
Refills: 0 | Status: COMPLETED | OUTPATIENT
Start: 2020-02-11 | End: 2020-02-11

## 2020-02-11 RX ORDER — SODIUM CHLORIDE 9 MG/ML
500 INJECTION INTRAMUSCULAR; INTRAVENOUS; SUBCUTANEOUS ONCE
Refills: 0 | Status: COMPLETED | OUTPATIENT
Start: 2020-02-12 | End: 2020-02-12

## 2020-02-11 RX ORDER — OXYCODONE HYDROCHLORIDE 5 MG/1
5 TABLET ORAL EVERY 4 HOURS
Refills: 0 | Status: DISCONTINUED | OUTPATIENT
Start: 2020-02-11 | End: 2020-02-13

## 2020-02-11 RX ORDER — ONDANSETRON 8 MG/1
4 TABLET, FILM COATED ORAL EVERY 6 HOURS
Refills: 0 | Status: DISCONTINUED | OUTPATIENT
Start: 2020-02-11 | End: 2020-02-13

## 2020-02-11 RX ADMIN — SODIUM CHLORIDE 120 MILLILITER(S): 9 INJECTION INTRAMUSCULAR; INTRAVENOUS; SUBCUTANEOUS at 23:12

## 2020-02-11 RX ADMIN — Medication 100 MILLIGRAM(S): at 23:07

## 2020-02-11 RX ADMIN — SODIUM CHLORIDE 500 MILLILITER(S): 9 INJECTION INTRAMUSCULAR; INTRAVENOUS; SUBCUTANEOUS at 21:00

## 2020-02-11 RX ADMIN — GABAPENTIN 100 MILLIGRAM(S): 400 CAPSULE ORAL at 13:07

## 2020-02-11 RX ADMIN — SODIUM CHLORIDE 3 MILLILITER(S): 9 INJECTION INTRAMUSCULAR; INTRAVENOUS; SUBCUTANEOUS at 13:09

## 2020-02-11 RX ADMIN — PANTOPRAZOLE SODIUM 40 MILLIGRAM(S): 20 TABLET, DELAYED RELEASE ORAL at 13:07

## 2020-02-11 RX ADMIN — CELECOXIB 200 MILLIGRAM(S): 200 CAPSULE ORAL at 13:07

## 2020-02-11 RX ADMIN — Medication 15 MILLIGRAM(S): at 23:13

## 2020-02-11 RX ADMIN — SODIUM CHLORIDE 500 MILLILITER(S): 9 INJECTION INTRAMUSCULAR; INTRAVENOUS; SUBCUTANEOUS at 22:00

## 2020-02-11 RX ADMIN — TRAMADOL HYDROCHLORIDE 50 MILLIGRAM(S): 50 TABLET ORAL at 16:03

## 2020-02-11 NOTE — PRE-ANESTHESIA EVALUATION ADULT - NSATTENDATTESTRD_GEN_ALL_CORE
Elavil:  Prescription approved per [facility] Refill Protocol.    Breonna Holland, RN, BSN    
The patient has been re-examined and I agree with the above assessment or I updated with my findings.

## 2020-02-11 NOTE — CHART NOTE - NSCHARTNOTEFT_GEN_A_CORE
Patient seen in RR with family at bedside. Patient Kazakh speaking, family assisting with translation. Resting without complaints.  Denies Chest Pain, SOB, N/V.     T(C): 36 (02-11-20 @ 20:05), Max: 36.9 (02-11-20 @ 13:12)  HR: 72 (02-11-20 @ 21:00) (72 - 93)  BP: 102/55 (02-11-20 @ 21:00) (92/52 - 143/83)  RR: 16 (02-11-20 @ 21:00) (15 - 17)  SpO2: 96% (02-11-20 @ 21:00) (94% - 98%)  Wt(kg): --    Exam:  Alert and [place], No Acute Distress  Card: +S1/S2, RRR  Pulm: CTAB  Laterality: Right knee ace bandage c/d/i  Calves/compartments soft, non-tender bilaterally  +PF/DF/EHL/FHL  SILT  + DP pulse    Xray: Post-op xray being performed in RR             A/P: Patient is a 74y Female S/p R Robotic Assisted TKA. VSS. NAD  -PT/OT-WBAT to RLE  -Remove ace bandage in AM  -IS encouraged  -DVT PPx- Aspirin 81 mg BID  -Pain Control PRN  -OOB to chair  -FU AM Labs    Jeannette Obando PA-C  Team Pager #[phone]

## 2020-02-11 NOTE — PRE-ANESTHESIA EVALUATION ADULT - NSANTHPMHFT_GEN_ALL_CORE
former Smoker: 0.5 ppd X 20 years: quit '00, Morbid Obesity: current BMI 40.3,  + Adrenal Mass: incidental finding during evaluation of Acute Diverticulitis 2/2018: followed yearly by endocrinologist: assymptomatic; No surgery, HLD, GERD, Intermittent Vertigo

## 2020-02-12 ENCOUNTER — TRANSCRIPTION ENCOUNTER (OUTPATIENT)
Age: 75
End: 2020-02-12

## 2020-02-12 LAB
ANION GAP SERPL CALC-SCNC: 11 MMOL/L — SIGNIFICANT CHANGE UP (ref 5–17)
BUN SERPL-MCNC: 12 MG/DL — SIGNIFICANT CHANGE UP (ref 7–23)
CALCIUM SERPL-MCNC: 8.5 MG/DL — SIGNIFICANT CHANGE UP (ref 8.4–10.5)
CHLORIDE SERPL-SCNC: 103 MMOL/L — SIGNIFICANT CHANGE UP (ref 96–108)
CO2 SERPL-SCNC: 23 MMOL/L — SIGNIFICANT CHANGE UP (ref 22–31)
CREAT SERPL-MCNC: 0.64 MG/DL — SIGNIFICANT CHANGE UP (ref 0.5–1.3)
GLUCOSE SERPL-MCNC: 137 MG/DL — HIGH (ref 70–99)
HCT VFR BLD CALC: 34.7 % — SIGNIFICANT CHANGE UP (ref 34.5–45)
HGB BLD-MCNC: 11.4 G/DL — LOW (ref 11.5–15.5)
MCHC RBC-ENTMCNC: 31.7 PG — SIGNIFICANT CHANGE UP (ref 27–34)
MCHC RBC-ENTMCNC: 32.9 GM/DL — SIGNIFICANT CHANGE UP (ref 32–36)
MCV RBC AUTO: 96.4 FL — SIGNIFICANT CHANGE UP (ref 80–100)
NRBC # BLD: 0 /100 WBCS — SIGNIFICANT CHANGE UP (ref 0–0)
PLATELET # BLD AUTO: 234 K/UL — SIGNIFICANT CHANGE UP (ref 150–400)
POTASSIUM SERPL-MCNC: 4.7 MMOL/L — SIGNIFICANT CHANGE UP (ref 3.5–5.3)
POTASSIUM SERPL-SCNC: 4.7 MMOL/L — SIGNIFICANT CHANGE UP (ref 3.5–5.3)
RBC # BLD: 3.6 M/UL — LOW (ref 3.8–5.2)
RBC # FLD: 12.7 % — SIGNIFICANT CHANGE UP (ref 10.3–14.5)
SODIUM SERPL-SCNC: 137 MMOL/L — SIGNIFICANT CHANGE UP (ref 135–145)
WBC # BLD: 10.08 K/UL — SIGNIFICANT CHANGE UP (ref 3.8–10.5)
WBC # FLD AUTO: 10.08 K/UL — SIGNIFICANT CHANGE UP (ref 3.8–10.5)

## 2020-02-12 RX ORDER — SENNA PLUS 8.6 MG/1
2 TABLET ORAL
Qty: 0 | Refills: 0 | DISCHARGE
Start: 2020-02-12

## 2020-02-12 RX ORDER — ACETAMINOPHEN 500 MG
3 TABLET ORAL
Qty: 0 | Refills: 0 | DISCHARGE
Start: 2020-02-12

## 2020-02-12 RX ORDER — DEXAMETHASONE 0.5 MG/5ML
8 ELIXIR ORAL ONCE
Refills: 0 | Status: COMPLETED | OUTPATIENT
Start: 2020-02-12 | End: 2020-02-12

## 2020-02-12 RX ADMIN — Medication 975 MILLIGRAM(S): at 22:21

## 2020-02-12 RX ADMIN — TRAMADOL HYDROCHLORIDE 50 MILLIGRAM(S): 50 TABLET ORAL at 09:22

## 2020-02-12 RX ADMIN — Medication 100 MILLIGRAM(S): at 06:00

## 2020-02-12 RX ADMIN — Medication 15 MILLIGRAM(S): at 12:30

## 2020-02-12 RX ADMIN — Medication 101.6 MILLIGRAM(S): at 06:01

## 2020-02-12 RX ADMIN — Medication 975 MILLIGRAM(S): at 21:21

## 2020-02-12 RX ADMIN — Medication 1000 MILLIGRAM(S): at 12:30

## 2020-02-12 RX ADMIN — Medication 15 MILLIGRAM(S): at 05:24

## 2020-02-12 RX ADMIN — PANTOPRAZOLE SODIUM 40 MILLIGRAM(S): 20 TABLET, DELAYED RELEASE ORAL at 05:24

## 2020-02-12 RX ADMIN — Medication 15 MILLIGRAM(S): at 17:32

## 2020-02-12 RX ADMIN — Medication 81 MILLIGRAM(S): at 05:24

## 2020-02-12 RX ADMIN — SIMVASTATIN 10 MILLIGRAM(S): 20 TABLET, FILM COATED ORAL at 21:20

## 2020-02-12 RX ADMIN — TRAMADOL HYDROCHLORIDE 50 MILLIGRAM(S): 50 TABLET ORAL at 22:22

## 2020-02-12 RX ADMIN — Medication 15 MILLIGRAM(S): at 00:00

## 2020-02-12 RX ADMIN — SENNA PLUS 2 TABLET(S): 8.6 TABLET ORAL at 21:22

## 2020-02-12 RX ADMIN — Medication 15 MILLIGRAM(S): at 17:45

## 2020-02-12 RX ADMIN — Medication 81 MILLIGRAM(S): at 17:32

## 2020-02-12 RX ADMIN — TRAMADOL HYDROCHLORIDE 50 MILLIGRAM(S): 50 TABLET ORAL at 10:00

## 2020-02-12 RX ADMIN — Medication 15 MILLIGRAM(S): at 06:00

## 2020-02-12 RX ADMIN — Medication 400 MILLIGRAM(S): at 03:03

## 2020-02-12 RX ADMIN — SODIUM CHLORIDE 500 MILLILITER(S): 9 INJECTION INTRAMUSCULAR; INTRAVENOUS; SUBCUTANEOUS at 05:24

## 2020-02-12 RX ADMIN — Medication 15 MILLIGRAM(S): at 12:06

## 2020-02-12 RX ADMIN — POLYETHYLENE GLYCOL 3350 17 GRAM(S): 17 POWDER, FOR SOLUTION ORAL at 12:07

## 2020-02-12 RX ADMIN — Medication 1000 MILLIGRAM(S): at 04:00

## 2020-02-12 RX ADMIN — TRAMADOL HYDROCHLORIDE 50 MILLIGRAM(S): 50 TABLET ORAL at 21:22

## 2020-02-12 RX ADMIN — Medication 400 MILLIGRAM(S): at 12:06

## 2020-02-12 NOTE — OCCUPATIONAL THERAPY INITIAL EVALUATION ADULT - MD ORDER
OT evaluate and treat  Activity: OOB to chair, WBAT, RLE Knee immobilizer when ambulating (d/c when pt able pt actively straight leg raise). OT evaluate and treat  Activity: OOB to chair, WBAT    per team no knee immobilizer at this time, pt able to perform SLR

## 2020-02-12 NOTE — OCCUPATIONAL THERAPY INITIAL EVALUATION ADULT - ADL RETRAINING, OT EVAL
1: complete LBD independently with AE as needed within 4 weeks, 2: complete grooming standing at sink independently within 4 weeks

## 2020-02-12 NOTE — DISCHARGE NOTE PROVIDER - NSDCFUADDINST_GEN_ALL_CORE_FT
Please follow up with your doctor 14 days after your discharge from the hospital (call for appointment).  PT-weight bearing as tolerated.  Aspirin 81 twice daily x 6 weeks total for dvt prevention, then resume normal baby aspirin regimen.  Keep dressing clean and intact, have doctor remove staples/sutures post op day 14 (if applicable) and apply steristrips.  Please follow up with your PMD within 1 month for routine checkup.

## 2020-02-12 NOTE — DISCHARGE NOTE PROVIDER - NSDCACTIVITY_GEN_ALL_CORE
Showering allowed/Stairs allowed/Walking - Indoors allowed/No heavy lifting/straining/Do not make important decisions/Walking - Outdoors allowed/Do not drive or operate machinery

## 2020-02-12 NOTE — PROGRESS NOTE ADULT - ASSESSMENT
A/P: Pt is a 74y Female POD 1 from R TKA.  -Pain Control  -DVT PPx  -WBAT  -PT/OT  -Encourage Incentive Spirometry  -Med Management  -Dispo Planning  -Will discuss with attending and advise if plan changes    Brianna Ghosh M.D.  PGY-2 Orthopaedic Surgery

## 2020-02-12 NOTE — DISCHARGE NOTE NURSING/CASE MANAGEMENT/SOCIAL WORK - PATIENT PORTAL LINK FT
You can access the FollowMyHealth Patient Portal offered by [facility] by registering at the following website: [email or web address]. By joining [facility]’s FollowMyHealth portal, you will also be able to view your health information using other applications (apps) compatible with our system.

## 2020-02-12 NOTE — PHYSICAL THERAPY INITIAL EVALUATION ADULT - GENERAL OBSERVATIONS, REHAB EVAL
Pt rec'ed reclined in bedside chair, NAD, +call bell, +IV, family at bedside to translate, maryam PT eval well w/o adverse reaction.

## 2020-02-12 NOTE — OCCUPATIONAL THERAPY INITIAL EVALUATION ADULT - PLANNED THERAPY INTERVENTIONS, OT EVAL
transfer training/IADL retraining/balance training/bed mobility training/ROM/strengthening/ADL retraining

## 2020-02-12 NOTE — DISCHARGE NOTE PROVIDER - CARE PROVIDER_API CALL
Roberto Mckoy)  Orthopaedic Surgery  [address] [address]  Phone: [phone]  Fax: [phone]  Follow Up Time:

## 2020-02-12 NOTE — PROGRESS NOTE ADULT - SUBJECTIVE AND OBJECTIVE BOX
Pt seen and examined at bedside. Pt reports pain is well controlled. No acute overnight events. Denies fevers, dizziness, CP, SOB, N/V, numbness/tingling, calf pain.    Vitals:  T(C): 36.6 (02-12-20 @ 04:51), Max: 36.9 (02-11-20 @ 13:12)  HR: 68 (02-12-20 @ 04:51) (67 - 93)  BP: 112/67 (02-12-20 @ 04:51) (92/52 - 143/83)  RR: 16 (02-12-20 @ 04:51) (15 - 17)  SpO2: 98% (02-12-20 @ 04:51) (93% - 100%)    Physical Exam:  Gen: NAD, resting comfortably    RLE:  ACE wrap DC'd  Dressing clean, dry, intact  +EHL/FHL/TA/GS  SILT L2-S1  +DP/PT Pulses  Compartments soft and compressible  No calf TTP B/L

## 2020-02-12 NOTE — OCCUPATIONAL THERAPY INITIAL EVALUATION ADULT - NS ASR OT EQUIP NEEDS DISCH
provided with RTS. Unable to fit tub bench into bathroom which is recommended DME for tub at this time as pt is unable to step over threshold

## 2020-02-12 NOTE — PHYSICAL THERAPY INITIAL EVALUATION ADULT - PLANNED THERAPY INTERVENTIONS, PT EVAL
gait training/balance training/bed mobility training/transfer training/GOAL: pt will negotiate a flight of steps (I) w/i 4wks

## 2020-02-12 NOTE — DISCHARGE NOTE PROVIDER - NSDCMRMEDTOKEN_GEN_ALL_CORE_FT
acetaminophen 325 mg oral tablet: 3 tab(s) orally every 8 hours, As Needed, mild pain/temp &gt;100.4F  Advil 200 mg oral tablet: 2 tab(s) orally every 6 hours, As Needed  Calcium 500+D: 1 tab(s) orally once a day  meclizine: 1 tab(s) orally prn  omega 3        1 gram: 1 dose(s) orally once a day  omeprazole 40 mg oral delayed release capsule: 1 cap(s) orally once a day  senna oral tablet: 2 tab(s) orally once a day (at bedtime), As needed, Constipation  simvastatin 10 mg oral tablet: 1 tab(s) orally once a day (at bedtime)  Vitamin B Complex: 1 tab(s) orally once a day acetaminophen 325 mg oral tablet: 3 tab(s) orally every 8 hours, As Needed, mild pain/temp &gt;100.4F  Advil 200 mg oral tablet: 2 tab(s) orally every 6 hours, As Needed  aspirin 81 mg oral delayed release tablet: 1 tab(s) orally 2 times a day x 4 weeks for prevention of clots  Calcium 500+D: 1 tab(s) orally once a day  Adebayo Rolling Walker: Dx: Right total knee replacement    JUDAH: 99 months  meclizine: 1 tab(s) orally prn  omega 3        1 gram: 1 dose(s) orally once a day  omeprazole 40 mg oral delayed release capsule: 1 cap(s) orally once a day  oxyCODONE 5 mg oral tablet: 1 - 2 tab(s) orally every 4 hours, As needed, Moderate Pain (4 - 6)  senna oral tablet: 2 tab(s) orally once a day (at bedtime), As needed, Constipation  simvastatin 10 mg oral tablet: 1 tab(s) orally once a day (at bedtime)  traMADol 50 mg oral tablet: 1 tab(s) orally every 6 hours, As needed, Mild Pain (1 - 3)  Vitamin B Complex: 1 tab(s) orally once a day acetaminophen 325 mg oral tablet: 3 tab(s) orally every 8 hours, As Needed, mild pain/temp &gt;100.4F  Advil 200 mg oral tablet: 2 tab(s) orally every 6 hours, As Needed  aspirin 81 mg oral delayed release tablet: 1 tab(s) orally 2 times a day x 4 weeks for prevention of clots  Calcium 500+D: 1 tab(s) orally once a day  Adebayo Rolling Walker: Dx: Right total knee replacement    JUDAH: 99 months  meclizine: 1 tab(s) orally prn  omega 3        1 gram: 1 dose(s) orally once a day  omeprazole 40 mg oral delayed release capsule: 1 cap(s) orally once a day  oxyCODONE 5 mg oral tablet: 1 tablet orally every 4 hours, As needed, Severe Pain MDD:6  senna oral tablet: 2 tab(s) orally once a day (at bedtime), As needed, Constipation  simvastatin 10 mg oral tablet: 1 tab(s) orally once a day (at bedtime)  traMADol 50 mg oral tablet: 1 tab(s) orally every 6 hours, As needed, Modearte Pain MDD:4  Vitamin B Complex: 1 tab(s) orally once a day

## 2020-02-12 NOTE — OCCUPATIONAL THERAPY INITIAL EVALUATION ADULT - LIVES WITH, PROFILE
lives with sister in a 2 family house on 2nd floor, +tub, +standard toilet. Independent at baseline, does not drive.

## 2020-02-12 NOTE — PHYSICAL THERAPY INITIAL EVALUATION ADULT - PERTINENT HX OF CURRENT PROBLEM, REHAB EVAL
74yoF, Maldivian speaking, PMH: former Smoker, Morbid Obesity: current BMI 40.3,  + Adrenal Mass, HLD, GERD, Intermittent Vertigo,  Intermittent Claudication; non-compliant w/ Cilostazol as prescribed, Osteoarthritis.  Current c/o severe bilateral Knee pain. Now s/p R TKR 2/11/10

## 2020-02-12 NOTE — OCCUPATIONAL THERAPY INITIAL EVALUATION ADULT - PERTINENT HX OF CURRENT PROBLEM, REHAB EVAL
73yo F  Current c/o severe bilateral Knee pain. Now scheduled ( 2-11-20): Right Total Knee replacement using JEFFREY Robot.

## 2020-02-12 NOTE — DISCHARGE NOTE PROVIDER - NSDCCPTREATMENT_GEN_ALL_CORE_FT
PRINCIPAL PROCEDURE  Procedure: Arthroplasty, knee, total, robot-assisted, using JEFFREY system  Findings and Treatment: rt

## 2020-02-12 NOTE — PROGRESS NOTE ADULT - SUBJECTIVE AND OBJECTIVE BOX
MEHUL GIBBONS  [id number]  02-12-20 @ 08:54      S:s/p TKR. The patient reports pain control is good.    O: The patient is alert. Examination shows alignment looks good, sensation is intact, motor function is intact, DPP is 2+. There is no calf pain.     A: The patient is doing well without complications.    P:  The patient will have physical therapy and will be discharged when cleared by P.T. Plan at this time is for discharge to home.    Roberto Mckoy M.D.

## 2020-02-12 NOTE — DISCHARGE NOTE PROVIDER - HOSPITAL COURSE
This is a 74 year old Female with PMHx of Diverticulitis, Gerd, Obesity, Vertigo, intermittent claudication admitted to [facility] on 2/11/20 for an elective total knee arthroplasty via JEFFREY robot which was unremarkable..  Patient evaluated and treated by PT, recommended for ..............  Remain of hospital stay unremarkable, and patient discharged to ................ This is a 74 year old Female with PMHx of Diverticulitis, Gerd, Obesity, Vertigo, intermittent claudication admitted to [facility] on 2/11/20 for an elective total knee arthroplasty via JEFFREY robot which was unremarkable..  Patient evaluated and treated by PT, recommended for home with home PT.  Remain of hospital stay unremarkable, and patient to be charged when cleared.

## 2020-02-12 NOTE — PHYSICAL THERAPY INITIAL EVALUATION ADULT - ADDITIONAL COMMENTS
Pt lives on the 2nd floor of a 2-family house w/ sister, 5 steps to enter house w/ R HR; additional 14 steps to negotiate to get to 2nd level w/ handrail on the L. PTA pt (I) w/ all functional mobility & ADL w/o AD.

## 2020-02-12 NOTE — OCCUPATIONAL THERAPY INITIAL EVALUATION ADULT - GENERAL OBSERVATIONS, REHAB EVAL
Pt received seated in bedside chair, +IV, +external catheter. Family at bedside to provide translation (Latvian speaking).

## 2020-02-13 VITALS
HEART RATE: 84 BPM | TEMPERATURE: 99 F | RESPIRATION RATE: 16 BRPM | DIASTOLIC BLOOD PRESSURE: 69 MMHG | SYSTOLIC BLOOD PRESSURE: 119 MMHG | OXYGEN SATURATION: 96 %

## 2020-02-13 DIAGNOSIS — Z71.89 OTHER SPECIFIED COUNSELING: ICD-10-CM

## 2020-02-13 PROBLEM — K21.9 GASTRO-ESOPHAGEAL REFLUX DISEASE WITHOUT ESOPHAGITIS: Chronic | Status: ACTIVE | Noted: 2020-01-28

## 2020-02-13 PROBLEM — R01.1 CARDIAC MURMUR, UNSPECIFIED: Chronic | Status: ACTIVE | Noted: 2020-01-28

## 2020-02-13 PROBLEM — E27.8 OTHER SPECIFIED DISORDERS OF ADRENAL GLAND: Chronic | Status: ACTIVE | Noted: 2020-01-28

## 2020-02-13 PROBLEM — E78.5 HYPERLIPIDEMIA, UNSPECIFIED: Chronic | Status: ACTIVE | Noted: 2020-01-28

## 2020-02-13 PROBLEM — K57.92 DIVERTICULITIS OF INTESTINE, PART UNSPECIFIED, WITHOUT PERFORATION OR ABSCESS WITHOUT BLEEDING: Chronic | Status: ACTIVE | Noted: 2020-01-28

## 2020-02-13 PROBLEM — Z64.1 PROBLEMS RELATED TO MULTIPARITY: Chronic | Status: ACTIVE | Noted: 2020-01-28

## 2020-02-13 PROBLEM — M19.90 UNSPECIFIED OSTEOARTHRITIS, UNSPECIFIED SITE: Chronic | Status: ACTIVE | Noted: 2020-01-28

## 2020-02-13 PROCEDURE — C1713: CPT

## 2020-02-13 PROCEDURE — 86901 BLOOD TYPING SEROLOGIC RH(D): CPT

## 2020-02-13 PROCEDURE — 73560 X-RAY EXAM OF KNEE 1 OR 2: CPT

## 2020-02-13 PROCEDURE — 97530 THERAPEUTIC ACTIVITIES: CPT

## 2020-02-13 PROCEDURE — C1776: CPT

## 2020-02-13 PROCEDURE — 97535 SELF CARE MNGMENT TRAINING: CPT

## 2020-02-13 PROCEDURE — 80048 BASIC METABOLIC PNL TOTAL CA: CPT

## 2020-02-13 PROCEDURE — 97166 OT EVAL MOD COMPLEX 45 MIN: CPT

## 2020-02-13 PROCEDURE — 85027 COMPLETE CBC AUTOMATED: CPT

## 2020-02-13 PROCEDURE — 86900 BLOOD TYPING SEROLOGIC ABO: CPT

## 2020-02-13 PROCEDURE — S2900: CPT

## 2020-02-13 PROCEDURE — 82962 GLUCOSE BLOOD TEST: CPT

## 2020-02-13 PROCEDURE — 97161 PT EVAL LOW COMPLEX 20 MIN: CPT

## 2020-02-13 PROCEDURE — 97116 GAIT TRAINING THERAPY: CPT

## 2020-02-13 RX ORDER — TRAMADOL HYDROCHLORIDE 50 MG/1
1 TABLET ORAL
Qty: 28 | Refills: 0
Start: 2020-02-13 | End: 2020-02-19

## 2020-02-13 RX ORDER — TRAMADOL HYDROCHLORIDE 50 MG/1
1 TABLET ORAL
Qty: 0 | Refills: 0 | DISCHARGE
Start: 2020-02-13

## 2020-02-13 RX ORDER — OXYCODONE HYDROCHLORIDE 5 MG/1
1 TABLET ORAL
Qty: 42 | Refills: 0
Start: 2020-02-13 | End: 2020-02-19

## 2020-02-13 RX ORDER — OXYCODONE HYDROCHLORIDE 5 MG/1
1 TABLET ORAL
Qty: 0 | Refills: 0 | DISCHARGE
Start: 2020-02-13

## 2020-02-13 RX ORDER — ASPIRIN/CALCIUM CARB/MAGNESIUM 324 MG
1 TABLET ORAL
Qty: 0 | Refills: 0 | DISCHARGE
Start: 2020-02-13

## 2020-02-13 RX ADMIN — CELECOXIB 200 MILLIGRAM(S): 200 CAPSULE ORAL at 11:36

## 2020-02-13 RX ADMIN — Medication 15 MILLIGRAM(S): at 01:52

## 2020-02-13 RX ADMIN — Medication 15 MILLIGRAM(S): at 05:15

## 2020-02-13 RX ADMIN — Medication 81 MILLIGRAM(S): at 05:15

## 2020-02-13 RX ADMIN — Medication 15 MILLIGRAM(S): at 00:52

## 2020-02-13 RX ADMIN — PANTOPRAZOLE SODIUM 40 MILLIGRAM(S): 20 TABLET, DELAYED RELEASE ORAL at 05:15

## 2020-02-13 RX ADMIN — POLYETHYLENE GLYCOL 3350 17 GRAM(S): 17 POWDER, FOR SOLUTION ORAL at 11:36

## 2020-02-13 RX ADMIN — Medication 975 MILLIGRAM(S): at 11:34

## 2020-02-13 RX ADMIN — Medication 975 MILLIGRAM(S): at 12:00

## 2020-02-13 NOTE — DIETITIAN INITIAL EVALUATION ADULT. - PHYSICAL APPEARANCE
other (specify) Skin per nursing documentation: Free of pressure injuries, surgical incision right knee.  Edema per nursing documentation: 2+ right knee

## 2020-02-13 NOTE — DIETITIAN INITIAL EVALUATION ADULT. - ADD RECOMMEND
1) Consider changing diet to Dash/TLC, Consistent Carbohydrate in setting of elevated blood glucose, HbA1c: 5.7% and obesity, 2) Educated patient on heart healthy, consistent carbohydrate nutrition therapy, will reinforce PRN, 3) Monitor PO intake and GI tolerance to diet, weight, skin integrity and labs 1) Consider changing diet to Dash/TLC in setting of obesity, 2) Educated patient on heart healthy nutrition therapy, will reinforce PRN, 3) Monitor PO intake and GI tolerance to diet, weight, skin integrity and labs 1) Consider changing diet to Dash/TLC in setting of obesity, 2) Educated patient on heart healthy nutrition therapy, will reinforce PRN, 3) Monitor PO intake and GI tolerance to diet, weight, skin integrity and labs 4) Sticker placed for BMI>40

## 2020-02-13 NOTE — CHART NOTE - NSCHARTNOTEFT_GEN_A_CORE
Upon Nutritional Assessment by the Registered Dietitian your patient was determined to meet criteria / has evidence of the following diagnosis/diagnoses:          [ ]  Mild Protein Calorie Malnutrition        [ ]  Moderate Protein Calorie Malnutrition        [ ] Severe Protein Calorie Malnutrition        [ ] Unspecified Protein Calorie Malnutrition        [ ] Underweight / BMI <19        [x] Morbid Obesity / BMI > 40      Findings as based on:  [x] Comprehensive nutrition assessment   [ ] Nutrition Focused Physical Exam  [x] Other: BMI 40.3 in setting of h/o excessive energy intake and sedentary lifestyle likely     Nutrition Plan/Recommendations:    1. Recommend change diet to DASH/TLC therapeutic diet to promote heart health.  2. Provide/review nutrition education as indicated.  3. Will continue to monitor nutrient intake, wt, labs, f/u per protocol.    RD remains available. Aviva Harper RD, CDN Pager: [phone]       PROVIDER Section:     By signing this assessment you are acknowledging and agree with the diagnosis/diagnoses assigned by the Registered Dietitian    Comments:

## 2020-02-13 NOTE — DIETITIAN INITIAL EVALUATION ADULT. - PERTINENT MEDS FT
dulcolax, celebrex, aluminum hydroxide/ magnesium hydroxide, Zofran, oxycodone, protoxin, Miralax, simvastatin

## 2020-02-13 NOTE — DIETITIAN INITIAL EVALUATION ADULT. - SIGNS/SYMPTOMS
Glu: 137 (H), HbA1c: 5.7% (pre-diabetes range), BMI: 40.3, no prior education provided s/p R total knee arthoplasty BMI: 40.3, food and nutrition-related knowledge deficit

## 2020-02-13 NOTE — PROGRESS NOTE ADULT - SUBJECTIVE AND OBJECTIVE BOX
Patient is a 74y old  Female who presents with a chief complaint of RTKA (12 Feb 2020 07:07)      POST OPERATIVE DAY #:  2  Patient comfortable  No complaints    T(C): 37 (02-13-20 @ 05:11), Max: 37.2 (02-12-20 @ 20:42)  HR: 71 (02-13-20 @ 05:11) (69 - 88)  BP: 101/56 (02-13-20 @ 05:11) (101/56 - 136/72)  RR: 17 (02-13-20 @ 05:11) (16 - 17)  SpO2: 97% (02-13-20 @ 05:11) (95% - 98%)  Wt(kg): --    PHYSICAL EXAM:  NAD, Alert  EXT:  Rt Knee Aquacel Dressing C/D/I  Calves soft  (+) DF/PF;  EHL FHL:  No gross Sensation deficits noted   (+) Distal Pulses;   B/L, PAS     LABS:                        11.4<L>  10.08 )-----------( 234      ( 12 Feb 2020 07:18 )             34.7     02-12    137  |  103  |  12  ----------------------------<  137<H>  4.7   |  23  |  0.64

## 2020-02-13 NOTE — DIETITIAN INITIAL EVALUATION ADULT. - ETIOLOGY
lack of prior nutrient-related education on heart healthy, consistent carbohydrate diet increased physiological demand for surgical healing history of excessive energy intake and sedentary lifestyle

## 2020-02-13 NOTE — DIETITIAN INITIAL EVALUATION ADULT. - OTHER INFO
Per chart 74 year old female admitted with complaint of severe arthritis in right knee. PMHx former smoker 1/2 pack ppd x 20 years, quit in 2000, morbid obesity (BMI: 40.3), acute diverticulitis (2/2018), osteoarthritis, mild heart murmur. Pt is now s/p R total knee arthoplasty (2/11).      Intake PTA: Pt reports with good appetite and PO intake. Following a regular diet at home, consuming 3 meals a day. Pt lives with sister-in-law who does the shopping and cooking. As per diet recall pt consuming a waffle and coffee for breakfast, a sandwich for lunch and pasta or chicken with vegetables for dinner. Will snack of fruit, avoids cakes and sugar beverages, drinks mostly water. NKFA / intolerances. No difficulty swallowing or chewing. No nausea, vomiting, diarrhea. Pt with constipation. Last BM 2/11. Noted Pt on Miralax and dulcolax. Endorses taking calcium + vitamin D, omega 3 fish oil.     Diet This Admission: Pt reports with lack of appetite due to not feeling hungry.         Education        Weight Per chart 74 year old female admitted with complaint of severe arthritis in right knee. PMHx former smoker 1/2 pack ppd x 20 years, quit in 2000, morbid obesity (BMI: 40.3), acute diverticulitis (2/2018), osteoarthritis, mild heart murmur. Pt is now s/p R total knee arthoplasty (2/11).      Intake PTA: Pt reports with good appetite and PO intake. Following a regular diet at home, consuming 3 meals a day. Pt lives with sister-in-law who does the shopping and cooking. As per diet recall pt consuming a waffle and coffee for breakfast, a sandwich for lunch and pasta or chicken with vegetables for dinner. Will snack of fruit, avoids cakes and sugar beverages, drinks mostly water. NKFA / intolerances. No difficulty swallowing or chewing. No nausea, vomiting, diarrhea. Pt with constipation. Last BM 2/11. Noted Pt on Miralax and dulcolax. Endorses taking calcium + vitamin D, omega 3 fish oil.     Diet This Admission: Pt reports with lack of appetite due to not feeling hungry and just sitting during the day. Pt is currently on a regular diet. Consuming 50-75 % of meals.     Education: Provided education on heart healthy consistent carbohydrate nutrition therapy. Reviewed portion sizes, increasing vegetable intake, pairing carbohydrates with protein, and avoiding concentrated sweets.      Weight: -196 pound consistent with dosing wt: 196 pounds, no recent wt change. Per chart 74 year old female admitted with complaint of severe arthritis in right knee. PMHx former smoker 1/2 pack ppd x 20 years, quit in 2000, morbid obesity (BMI: 40.3), HLD, GERD, intermitted vertigo, acute diverticulitis (2/2018), osteoarthritis, mild heart murmur. Pt is now s/p R total knee arthoplasty (2/11).      Intake PTA: Pt reports with good appetite and PO intake. Following a regular diet at home, consuming 3 meals a day. Pt lives with sister-in-law who does the shopping and cooking. As per diet recall pt consuming a waffle and coffee for breakfast, a sandwich for lunch and pasta or chicken with vegetables for dinner. Will snack on fruit, avoids cakes and sugary beverages, drinks mostly water. NKFA / intolerances. No difficulty swallowing or chewing. No nausea, vomiting, diarrhea. Pt with constipation. Last BM 2/11. Noted Pt on Miralax and dulcolax. Endorses taking calcium + vitamin D, omega 3 fish oil.     Diet This Admission: Pt reports with lack of appetite due to not feeling hungry and just sitting during the day. Pt is currently on a regular diet. Consuming 50-75 % of meals.     Education: Provided education on heart healthy consistent carbohydrate nutrition therapy. Reviewed portion sizes, increasing vegetable intake, pairing carbohydrates with protein, healthy fats and avoiding concentrated sweets. Provided written material at bedside. Noted Pt with HbA1c: 5.7% (pre-diabetes range). Informed RN and provided.     Weight: -196 pound consistent with dosing wt: 196 pounds, no recent wt change. Per chart 74 year old female admitted with complaint of severe arthritis in right knee. PMHx former smoker 1/2 pack ppd x 20 years, quit in 2000, morbid obesity (BMI: 40.3), HLD, GERD, intermitted vertigo, acute diverticulitis (2/2018), osteoarthritis, mild heart murmur. Pt is now s/p R total knee arthoplasty (2/11).      Intake PTA: Pt reports with good appetite and PO intake. Following a regular diet at home, consuming 3 meals a day. Pt lives with sister-in-law who does the shopping and cooking. As per diet recall pt consuming a waffle and coffee for breakfast, a sandwich for lunch and pasta or chicken with vegetables for dinner. Will snack on fruit, avoids cakes and sugary beverages, drinks mostly water. NKFA / intolerances. No difficulty swallowing or chewing. No nausea, vomiting, diarrhea. Pt with constipation. Last BM 2/11. Noted Pt on Miralax and dulcolax. Endorses taking calcium + vitamin D, omega 3 fish oil.     Diet This Admission: Pt reports with lack of appetite due to not feeling hungry and just sitting during the day. Pt is currently on a regular diet. Consuming 50-75 % of meals.     Education: Provided education on heart healthy nutrition therapy. Reviewed portion sizes, increasing vegetable intake, pairing carbohydrates with protein, healthy fats and avoiding concentrated sweets. Provided written material at bedside. Noted Pt with HbA1c: 5.7%, Informed RN and provider.    Weight: -196 pound consistent with dosing wt: 196 pounds, no recent wt change.

## 2020-02-13 NOTE — DIETITIAN INITIAL EVALUATION ADULT. - REASON INDICATOR FOR ASSESSMENT
length of stay, BMI >40   Source: Patient interview, RN, EMR  Interview limited due to Romanian speaking, able to speak a little bit of english length of stay, BMI >40   Source: Patient interview, RN, EMR  Interview limited due to Pt is Spanish speaking, able to speak a little bit of english length of stay, BMI >40   Source: Patient interview, RN, EMR  Interview limited due to Pt is Serbian speaking, however able to complete RD interview in english.

## 2020-10-15 ENCOUNTER — APPOINTMENT (OUTPATIENT)
Dept: ORTHOPEDIC SURGERY | Facility: CLINIC | Age: 75
End: 2020-10-15
Payer: MEDICARE

## 2020-10-15 DIAGNOSIS — Z82.61 FAMILY HISTORY OF ARTHRITIS: ICD-10-CM

## 2020-10-15 DIAGNOSIS — Z01.818 ENCOUNTER FOR OTHER PREPROCEDURAL EXAMINATION: ICD-10-CM

## 2020-10-15 DIAGNOSIS — Z87.891 PERSONAL HISTORY OF NICOTINE DEPENDENCE: ICD-10-CM

## 2020-10-15 DIAGNOSIS — Z80.9 FAMILY HISTORY OF MALIGNANT NEOPLASM, UNSPECIFIED: ICD-10-CM

## 2020-10-15 DIAGNOSIS — Z78.9 OTHER SPECIFIED HEALTH STATUS: ICD-10-CM

## 2020-10-15 PROCEDURE — 99203 OFFICE O/P NEW LOW 30 MIN: CPT

## 2020-10-16 NOTE — END OF VISIT
[FreeTextEntry3] : I, Jack Lawton MD, ordering physician, have read and attest that all the information, medical decision making and discharge instructions within are true and accurate.

## 2020-10-16 NOTE — DISCUSSION/SUMMARY
[de-identified] : Patient expressed that she does not want any injections. \par \par The underlying pathophysiology was reviewed with the patient. Treatment options were discussed including; nonsurgical and surgical intervention. \par \par The patient wishes to proceed with left long and ring trigger finger release, left carpal tunnel release at this time. The risks and benefits were reviewed with the patient. All of her questions were answered. She will meet with our surgical scheduler.

## 2020-10-16 NOTE — PHYSICAL EXAM
[de-identified] : Patient is WDWN, alert, and in no acute distress. Breathing is unlabored. She is grossly oriented to person, place, and time. \par \par Left hand: There is A1 pulley tenderness in the long and ring finger. Full arc of motion in the fingers, and all intrinsic and extrinsic hand muscles 5/5. No joint instability on provocative testing, sensation is intact to light touch, and no skin lesions or discoloration. \par \par Left Wrist: No tenderness, edema, or deformities. No thenar atrophy. Full ROM with decreased sensation along median nerve distribution. \par Tests/Signs: Tinel's sign is negative over carpal tunnel, Phalen's test is positive. \par \par Right Wrist: No tenderness, edema, or deformities. No thenar atrophy. Full ROM with decreased sensation along median nerve distribution. \par Tests/Signs: Tinel's sign is negative over carpal tunnel, Phalen's test is positive. \par  [de-identified] : No imaging done today.

## 2020-10-16 NOTE — HISTORY OF PRESENT ILLNESS
[de-identified] : Patient is a 74 year old Chinese speaking female who presents with bilateral hand numbness right > left. She states that she had surgery 20 years on bilateral hands in [place]. She states that it got better but 4 years ago it returned. She states that all of her fingers in her left hand are numb. She states that she wakes up at night due to pain. She states that the brace does not help. Denies DM. \par She also c/o left long and ring trigger fingers. \par She is accompanied by family to translate.

## 2020-10-16 NOTE — ADDENDUM
[FreeTextEntry1] : I, Celeste Echevarria wrote this note acting as a scribe for Dr. Jack Lawton on Oct 15, 2020.

## 2020-10-23 ENCOUNTER — OUTPATIENT (OUTPATIENT)
Dept: OUTPATIENT SERVICES | Facility: HOSPITAL | Age: 75
LOS: 1 days | End: 2020-10-23
Payer: MEDICARE

## 2020-10-23 VITALS
HEART RATE: 86 BPM | TEMPERATURE: 97 F | OXYGEN SATURATION: 100 % | DIASTOLIC BLOOD PRESSURE: 56 MMHG | SYSTOLIC BLOOD PRESSURE: 147 MMHG | WEIGHT: 201.28 LBS | HEIGHT: 60 IN | RESPIRATION RATE: 22 BRPM

## 2020-10-23 DIAGNOSIS — Z98.890 OTHER SPECIFIED POSTPROCEDURAL STATES: Chronic | ICD-10-CM

## 2020-10-23 DIAGNOSIS — M65.312 TRIGGER THUMB, LEFT THUMB: ICD-10-CM

## 2020-10-23 DIAGNOSIS — Z96.651 PRESENCE OF RIGHT ARTIFICIAL KNEE JOINT: Chronic | ICD-10-CM

## 2020-10-23 DIAGNOSIS — G56.02 CARPAL TUNNEL SYNDROME, LEFT UPPER LIMB: ICD-10-CM

## 2020-10-23 DIAGNOSIS — Z01.818 ENCOUNTER FOR OTHER PREPROCEDURAL EXAMINATION: ICD-10-CM

## 2020-10-23 DIAGNOSIS — M65.342 TRIGGER FINGER, LEFT RING FINGER: ICD-10-CM

## 2020-10-23 DIAGNOSIS — M65.332 TRIGGER FINGER, LEFT MIDDLE FINGER: ICD-10-CM

## 2020-10-23 RX ORDER — MECLIZINE HCL 12.5 MG
1 TABLET ORAL
Qty: 0 | Refills: 0 | DISCHARGE

## 2020-10-23 RX ORDER — OMEPRAZOLE 10 MG/1
1 CAPSULE, DELAYED RELEASE ORAL
Qty: 0 | Refills: 0 | DISCHARGE

## 2020-10-23 RX ORDER — IBUPROFEN 200 MG
2 TABLET ORAL
Qty: 0 | Refills: 0 | DISCHARGE

## 2020-10-23 NOTE — H&P PST ADULT - ASSESSMENT
this is a 73 y/o female who is scheduled for left carpal tunnel release and trigger finger releases on 10/30/20

## 2020-10-23 NOTE — H&P PST ADULT - NSICDXPASTSURGICALHX_GEN_ALL_CORE_FT
PAST SURGICAL HISTORY:  S/P carpal tunnel release '90's  Bilateral    S/P colonoscopy 4/2019     Negative    S/P endoscopy '2019    S/P total knee replacement, right

## 2020-10-23 NOTE — H&P PST ADULT - HISTORY OF PRESENT ILLNESS
this is a 75 y/o female who has had bilateral carpal tunnel releases in past, but the problem came back 4 yrs ago, she has pain numbness and tingling in her left hand and trigger fingers; to have carpal tunnel and trigger finger releases

## 2020-10-23 NOTE — H&P PST ADULT - NSICDXPROBLEM_GEN_ALL_CORE_FT
PROBLEM DIAGNOSES  Problem: Left carpal tunnel syndrome  Assessment and Plan: left carpal tunnel release, left long and ring trigger finger release, covid appt given, preop instructions given, going for medical clearance

## 2020-10-23 NOTE — H&P PST ADULT - NSICDXPASTMEDICALHX_GEN_ALL_CORE_FT
PAST MEDICAL HISTORY:  Acute diverticulitis 2/2018    medically managed; No surgery    Carpal tunnel syndrome '90's  Bilateral:  surgically treated-came back    Diverticulosis     Gallstone     GERD (gastroesophageal reflux disease)     Heart murmur mild    HLD (hyperlipidemia)     Intermittent claudication Evaluated. Cilostazol  prescribed: non-compliant    Left adrenal mass dx: 2/2018    Incidental finding during evaluation of Acute Diverticulitis.  Assymptomatic. Evaluated by Endocrinologist: followup yearly. No surgery.    Morbid obesity with BMI of 40.0-44.9, adult     Multiparity     Osteoarthritis     Vertigo intermittent.  Evaluated: meclizine prn

## 2020-10-23 NOTE — H&P PST ADULT - VTE RISK COMMENTS
----- Message from Shane Cam sent at 8/21/2018 11:52 AM CDT -----  Contact: [phone]/self  Pt states her insurance company needs the CPT code for her upcoming procedure   Please call    no covid-neg antibodies

## 2020-10-23 NOTE — H&P PST ADULT - NSICDXFAMILYHX_GEN_ALL_CORE_FT
FAMILY HISTORY:  Family history of breast cancer in sister  Family history of colon cancer, Sister  Family history of colon cancer in mother  Family history of liver cancer, Sister  Family history of throat cancer, Father

## 2020-10-23 NOTE — H&P PST ADULT - NEGATIVE PSYCHIATRIC SYMPTOMS
no suicidal ideation Principal Discharge DX:	PE (pulmonary thromboembolism)  Secondary Diagnosis:	EKG abnormality  Secondary Diagnosis:	Alcohol abuse

## 2020-10-26 PROCEDURE — G0463: CPT

## 2020-10-27 DIAGNOSIS — Z11.59 ENCOUNTER FOR SCREENING FOR OTHER VIRAL DISEASES: ICD-10-CM

## 2020-10-27 PROBLEM — K80.20 CALCULUS OF GALLBLADDER WITHOUT CHOLECYSTITIS WITHOUT OBSTRUCTION: Chronic | Status: ACTIVE | Noted: 2020-10-23

## 2020-10-28 ENCOUNTER — OUTPATIENT (OUTPATIENT)
Dept: OUTPATIENT SERVICES | Facility: HOSPITAL | Age: 75
LOS: 1 days | End: 2020-10-28
Payer: MEDICARE

## 2020-10-28 DIAGNOSIS — Z98.890 OTHER SPECIFIED POSTPROCEDURAL STATES: Chronic | ICD-10-CM

## 2020-10-28 DIAGNOSIS — Z96.651 PRESENCE OF RIGHT ARTIFICIAL KNEE JOINT: Chronic | ICD-10-CM

## 2020-10-28 DIAGNOSIS — Z11.59 ENCOUNTER FOR SCREENING FOR OTHER VIRAL DISEASES: ICD-10-CM

## 2020-10-28 LAB — SARS-COV-2 RNA SPEC QL NAA+PROBE: SIGNIFICANT CHANGE UP

## 2020-10-28 PROCEDURE — U0003: CPT

## 2020-10-29 ENCOUNTER — TRANSCRIPTION ENCOUNTER (OUTPATIENT)
Age: 75
End: 2020-10-29

## 2020-10-29 NOTE — ASU PATIENT PROFILE, ADULT - PSH
S/P carpal tunnel release  '90's  Bilateral  S/P colonoscopy  4/2019     Negative  S/P endoscopy  '2019  S/P total knee replacement, right

## 2020-10-29 NOTE — ASU PATIENT PROFILE, ADULT - PMH
Acute diverticulitis  2/2018    medically managed; No surgery  Carpal tunnel syndrome  '90's  Bilateral:  surgically treated-came back  Diverticulosis    Gallstone    GERD (gastroesophageal reflux disease)    Heart murmur  mild  HLD (hyperlipidemia)    Intermittent claudication  Evaluated. Cilostazol  prescribed: non-compliant  Left adrenal mass  dx: 2/2018    Incidental finding during evaluation of Acute Diverticulitis.  Assymptomatic. Evaluated by Endocrinologist: followup yearly. No surgery.  Morbid obesity with BMI of 40.0-44.9, adult    Multiparity    Osteoarthritis    Vertigo  intermittent.  Evaluated: meclizine prn

## 2020-10-29 NOTE — ASU PATIENT PROFILE, ADULT - PROVIDER NOTIFICATION
pt slipped down 12 steps when he tripped and put left hand out behind him trying to break fall and wrist hit each step as he came down  no head injury or loc  pt has pain to left arm from elbow-wrist, upper arm/shoulder no pain  Denies fever/chill/HA/dizziness/chest pain/palpitation/sob/abd pain/n/v/d/ black stool/bloody stool/urinary sxs Declines

## 2020-10-30 ENCOUNTER — OUTPATIENT (OUTPATIENT)
Dept: OUTPATIENT SERVICES | Facility: HOSPITAL | Age: 75
LOS: 1 days | End: 2020-10-30
Payer: MEDICARE

## 2020-10-30 ENCOUNTER — APPOINTMENT (OUTPATIENT)
Dept: ORTHOPEDIC SURGERY | Facility: HOSPITAL | Age: 75
End: 2020-10-30

## 2020-10-30 VITALS
RESPIRATION RATE: 22 BRPM | HEART RATE: 86 BPM | OXYGEN SATURATION: 100 % | HEIGHT: 57 IN | WEIGHT: 201.28 LBS | DIASTOLIC BLOOD PRESSURE: 56 MMHG | TEMPERATURE: 97 F | SYSTOLIC BLOOD PRESSURE: 147 MMHG

## 2020-10-30 VITALS
TEMPERATURE: 98 F | HEART RATE: 76 BPM | SYSTOLIC BLOOD PRESSURE: 136 MMHG | DIASTOLIC BLOOD PRESSURE: 69 MMHG | RESPIRATION RATE: 16 BRPM | OXYGEN SATURATION: 96 %

## 2020-10-30 DIAGNOSIS — M65.312 TRIGGER THUMB, LEFT THUMB: ICD-10-CM

## 2020-10-30 DIAGNOSIS — Z98.890 OTHER SPECIFIED POSTPROCEDURAL STATES: Chronic | ICD-10-CM

## 2020-10-30 DIAGNOSIS — G56.02 CARPAL TUNNEL SYNDROME, LEFT UPPER LIMB: ICD-10-CM

## 2020-10-30 DIAGNOSIS — Z96.651 PRESENCE OF RIGHT ARTIFICIAL KNEE JOINT: Chronic | ICD-10-CM

## 2020-10-30 DIAGNOSIS — M65.342 TRIGGER FINGER, LEFT RING FINGER: ICD-10-CM

## 2020-10-30 DIAGNOSIS — M65.332 TRIGGER FINGER, LEFT MIDDLE FINGER: ICD-10-CM

## 2020-10-30 PROCEDURE — 26055 INCISE FINGER TENDON SHEATH: CPT | Mod: F2

## 2020-10-30 PROCEDURE — 64721 CARPAL TUNNEL SURGERY: CPT | Mod: LT

## 2020-10-30 RX ORDER — CEFAZOLIN SODIUM 1 G
2000 VIAL (EA) INJECTION ONCE
Refills: 0 | Status: COMPLETED | OUTPATIENT
Start: 2020-10-30 | End: 2020-10-30

## 2020-10-30 RX ORDER — OXYCODONE HYDROCHLORIDE 5 MG/1
5 TABLET ORAL ONCE
Refills: 0 | Status: DISCONTINUED | OUTPATIENT
Start: 2020-10-30 | End: 2020-10-30

## 2020-10-30 RX ORDER — CHLORHEXIDINE GLUCONATE 213 G/1000ML
1 SOLUTION TOPICAL ONCE
Refills: 0 | Status: COMPLETED | OUTPATIENT
Start: 2020-10-30 | End: 2020-10-30

## 2020-10-30 RX ORDER — IBUPROFEN 200 MG
1 TABLET ORAL
Qty: 30 | Refills: 0
Start: 2020-10-30

## 2020-10-30 RX ORDER — SODIUM CHLORIDE 9 MG/ML
1000 INJECTION, SOLUTION INTRAVENOUS
Refills: 0 | Status: DISCONTINUED | OUTPATIENT
Start: 2020-10-30 | End: 2020-10-30

## 2020-10-30 RX ORDER — APREPITANT 80 MG/1
40 CAPSULE ORAL ONCE
Refills: 0 | Status: COMPLETED | OUTPATIENT
Start: 2020-10-30 | End: 2020-10-30

## 2020-10-30 RX ADMIN — APREPITANT 40 MILLIGRAM(S): 80 CAPSULE ORAL at 06:24

## 2020-10-30 RX ADMIN — CHLORHEXIDINE GLUCONATE 1 APPLICATION(S): 213 SOLUTION TOPICAL at 06:25

## 2020-10-30 NOTE — BRIEF OPERATIVE NOTE - NSICDXBRIEFPROCEDURE_GEN_ALL_CORE_FT
PROCEDURES:  Release, trigger finger 30-Oct-2020 08:10:26 left x2 Brenda Davey  Carpal tunnel release 30-Oct-2020 08:08:51 left Brenda Davey

## 2020-10-30 NOTE — ASU DISCHARGE PLAN (ADULT/PEDIATRIC) - ASU DC SPECIAL INSTRUCTIONSFT
Elevate [Left  arm in sling daily when up & walking.  Elevate the  hand/arm above heart level on pillow/blankets when lying down.  Pad the neck strap with athletic sock/collared shirt.  Apply ice packs to top of  Left  hand for 20 min on and off  Keep bandage clean, dry , & intact daily.  May open & close the fingers of the operated arm every hour for exercise.  Call the Dr.  for fever, severe pain, fall or hand injury.  Call for an appointment for office visit  in 10-14 days.

## 2020-10-30 NOTE — ASU DISCHARGE PLAN (ADULT/PEDIATRIC) - CARE PROVIDER_API CALL
Jack Lawton  ORTHOPAEDIC SURGERY  [address], [address]  [place] [address]  Phone: [phone]  Fax: [phone]  Follow Up Time:

## 2020-10-30 NOTE — BRIEF OPERATIVE NOTE - NSICDXBRIEFPOSTOP_GEN_ALL_CORE_FT
POST-OP DIAGNOSIS:  Left trigger finger 30-Oct-2020 08:10:46 ring and long finger Brenda Davey  Carpal tunnel syndrome 30-Oct-2020 08:09:42  Brenda Davey

## 2020-10-30 NOTE — ASU PREOP CHECKLIST - NSSDAENDDT_GEN_ALL_CORE
Final/pending stool testing and C diff not completed discussed with MD.    Patient denies any loose/diarrhea since Sunday.   Per MD, unlikely to have C Diff.    Patient instructed and states understanding.    30-Oct-2020 30-Oct-2020 07:25

## 2020-10-30 NOTE — ASU DISCHARGE PLAN (ADULT/PEDIATRIC) - CALL YOUR DOCTOR IF YOU HAVE ANY OF THE FOLLOWING:
Pain not relieved by Medications/Numbness, tingling, color or temperature change to extremity/Swelling that gets worse/Wound/Surgical Site with redness, or foul smelling discharge or pus/Fever greater than (need to indicate Fahrenheit or Celsius)/Bleeding that does not stop

## 2020-11-09 ENCOUNTER — APPOINTMENT (OUTPATIENT)
Dept: ORTHOPEDIC SURGERY | Facility: CLINIC | Age: 75
End: 2020-11-09
Payer: MEDICARE

## 2020-11-09 DIAGNOSIS — M65.342 TRIGGER FINGER, LEFT RING FINGER: ICD-10-CM

## 2020-11-09 DIAGNOSIS — M65.332 TRIGGER FINGER, LEFT MIDDLE FINGER: ICD-10-CM

## 2020-11-09 PROCEDURE — 99024 POSTOP FOLLOW-UP VISIT: CPT

## 2020-11-09 RX ORDER — IBUPROFEN 600 MG/1
600 TABLET, FILM COATED ORAL
Qty: 30 | Refills: 0 | Status: ACTIVE | COMMUNITY
Start: 2020-10-30

## 2020-11-09 RX ORDER — OXYCODONE AND ACETAMINOPHEN 5; 325 MG/1; MG/1
5-325 TABLET ORAL
Qty: 5 | Refills: 0 | Status: ACTIVE | COMMUNITY
Start: 2020-10-30

## 2020-11-09 NOTE — ADDENDUM
[FreeTextEntry1] : I, Celeste Echevarria wrote this note acting as a scribe for Dr. Jack Lawton on Nov 09, 2020.

## 2020-11-09 NOTE — HISTORY OF PRESENT ILLNESS
[de-identified] : s/p decompression left carpal tunnel, decompression left long ad ring finger stenosing tenosynovitis [de-identified] : The patient is a 74 year old female who returns for the 1st postoperative visit after undergoing decompression left carpal tunnel, decompression left long ad ring finger stenosing tenosynovitis at [facility]. The surgery was performed on 10/30/2020. The patient is recovering at home. She reports mild postoperative pain. She reports that she has been taking the pain medication. She is accompanied by family for translation.  [de-identified] : Patient is WDWN, alert, and in no acute distress. Breathing is unlabored. She is grossly oriented to person, place, and time. \par \par Incision is healed. There are no signs of infection. Sutures were removed. Normal amount of postoperative edema and tenderness present.  [de-identified] : No imaging done today.  [de-identified] : Sutures were removed. Benzoin and steri strips were applied over the incision sites. Massage the scar with vitamin E oil once the strips have fallen off. Gentle range of motion exercises were encouraged. Patient was encouraged to increase use of the hand. Follow up in 6 weeks.

## 2020-12-07 ENCOUNTER — APPOINTMENT (OUTPATIENT)
Dept: ORTHOPEDIC SURGERY | Facility: CLINIC | Age: 75
End: 2020-12-07
Payer: MEDICARE

## 2020-12-07 DIAGNOSIS — G56.02 CARPAL TUNNEL SYNDROME, LEFT UPPER LIMB: ICD-10-CM

## 2020-12-07 PROCEDURE — 99024 POSTOP FOLLOW-UP VISIT: CPT

## 2020-12-07 NOTE — HISTORY OF PRESENT ILLNESS
[de-identified] : s/p decompression left carpal tunnel, decompression left long and ring finger stenosing tenosynovitis [de-identified] : The patient is a 74 year old female who returns for the 2nd postoperative visit after undergoing decompression left carpal tunnel, decompression left long ad ring finger stenosing tenosynovitis at [facility]. The surgery was performed on 10/30/2020. The patient is recovering at home. She reports mild postoperative pain. She reports that she has been taking the pain medication. She is accompanied by family for translation. She returns 4 weeks later stating that her fingertips are numb.  [de-identified] : Patient is WDWN, alert, and in no acute distress. Breathing is unlabored. She is grossly oriented to person, place, and time. \par \par Incision is healed. There are no signs of infection.  Normal amount of postoperative edema and tenderness present.  [de-identified] : No imaging done today.  [de-identified] : Massage the scar with vitamin E oil. \par The patient was advised to soak the hand in warm water and Epsom salt. \par Range of motion and strengthening exercises were reviewed. \par NSAIDs as tolerated. \par Follow Up in 3 months if needed.

## 2020-12-07 NOTE — ADDENDUM
[FreeTextEntry1] : I, Celeste Echevarria wrote this note acting as a scribe for Dr. Jack Lawton on Dec 07, 2020.

## 2021-01-19 ENCOUNTER — OUTPATIENT (OUTPATIENT)
Dept: OUTPATIENT SERVICES | Facility: HOSPITAL | Age: 76
LOS: 1 days | End: 2021-01-19
Payer: MEDICARE

## 2021-01-19 VITALS
SYSTOLIC BLOOD PRESSURE: 140 MMHG | WEIGHT: 203.93 LBS | DIASTOLIC BLOOD PRESSURE: 84 MMHG | HEART RATE: 88 BPM | TEMPERATURE: 97 F | OXYGEN SATURATION: 98 % | HEIGHT: 58 IN | RESPIRATION RATE: 14 BRPM

## 2021-01-19 DIAGNOSIS — M17.12 UNILATERAL PRIMARY OSTEOARTHRITIS, LEFT KNEE: ICD-10-CM

## 2021-01-19 DIAGNOSIS — Z98.890 OTHER SPECIFIED POSTPROCEDURAL STATES: Chronic | ICD-10-CM

## 2021-01-19 DIAGNOSIS — Z96.651 PRESENCE OF RIGHT ARTIFICIAL KNEE JOINT: Chronic | ICD-10-CM

## 2021-01-19 LAB
ALBUMIN SERPL ELPH-MCNC: 4.7 G/DL — SIGNIFICANT CHANGE UP (ref 3.3–5)
ALP SERPL-CCNC: 74 U/L — SIGNIFICANT CHANGE UP (ref 40–120)
ALT FLD-CCNC: 18 U/L — SIGNIFICANT CHANGE UP (ref 4–33)
ANION GAP SERPL CALC-SCNC: 13 MMOL/L — SIGNIFICANT CHANGE UP (ref 7–14)
APPEARANCE UR: CLEAR — SIGNIFICANT CHANGE UP
AST SERPL-CCNC: 18 U/L — SIGNIFICANT CHANGE UP (ref 4–32)
BACTERIA # UR AUTO: NEGATIVE — SIGNIFICANT CHANGE UP
BILIRUB SERPL-MCNC: 0.5 MG/DL — SIGNIFICANT CHANGE UP (ref 0.2–1.2)
BILIRUB UR-MCNC: NEGATIVE — SIGNIFICANT CHANGE UP
BLD GP AB SCN SERPL QL: NEGATIVE — SIGNIFICANT CHANGE UP
BUN SERPL-MCNC: 16 MG/DL — SIGNIFICANT CHANGE UP (ref 7–23)
CALCIUM SERPL-MCNC: 10.5 MG/DL — SIGNIFICANT CHANGE UP (ref 8.4–10.5)
CHLORIDE SERPL-SCNC: 103 MMOL/L — SIGNIFICANT CHANGE UP (ref 98–107)
CO2 SERPL-SCNC: 26 MMOL/L — SIGNIFICANT CHANGE UP (ref 22–31)
COLOR SPEC: YELLOW — SIGNIFICANT CHANGE UP
CREAT SERPL-MCNC: 0.73 MG/DL — SIGNIFICANT CHANGE UP (ref 0.5–1.3)
DIFF PNL FLD: NEGATIVE — SIGNIFICANT CHANGE UP
EPI CELLS # UR: 3 /HPF — SIGNIFICANT CHANGE UP (ref 0–5)
GLUCOSE SERPL-MCNC: 106 MG/DL — HIGH (ref 70–99)
GLUCOSE UR QL: NEGATIVE — SIGNIFICANT CHANGE UP
HCT VFR BLD CALC: 41.4 % — SIGNIFICANT CHANGE UP (ref 34.5–45)
HGB BLD-MCNC: 13.3 G/DL — SIGNIFICANT CHANGE UP (ref 11.5–15.5)
KETONES UR-MCNC: NEGATIVE — SIGNIFICANT CHANGE UP
LEUKOCYTE ESTERASE UR-ACNC: ABNORMAL
MCHC RBC-ENTMCNC: 29.8 PG — SIGNIFICANT CHANGE UP (ref 27–34)
MCHC RBC-ENTMCNC: 32.1 GM/DL — SIGNIFICANT CHANGE UP (ref 32–36)
MCV RBC AUTO: 92.8 FL — SIGNIFICANT CHANGE UP (ref 80–100)
MRSA PCR RESULT.: SIGNIFICANT CHANGE UP
NITRITE UR-MCNC: NEGATIVE — SIGNIFICANT CHANGE UP
NRBC # BLD: 0 /100 WBCS — SIGNIFICANT CHANGE UP
NRBC # FLD: 0 K/UL — SIGNIFICANT CHANGE UP
PH UR: 7 — SIGNIFICANT CHANGE UP (ref 5–8)
PLATELET # BLD AUTO: 303 K/UL — SIGNIFICANT CHANGE UP (ref 150–400)
POTASSIUM SERPL-MCNC: 4.1 MMOL/L — SIGNIFICANT CHANGE UP (ref 3.5–5.3)
POTASSIUM SERPL-SCNC: 4.1 MMOL/L — SIGNIFICANT CHANGE UP (ref 3.5–5.3)
PROT SERPL-MCNC: 8.3 G/DL — SIGNIFICANT CHANGE UP (ref 6–8.3)
PROT UR-MCNC: NEGATIVE — SIGNIFICANT CHANGE UP
RBC # BLD: 4.46 M/UL — SIGNIFICANT CHANGE UP (ref 3.8–5.2)
RBC # FLD: 13 % — SIGNIFICANT CHANGE UP (ref 10.3–14.5)
RBC CASTS # UR COMP ASSIST: 3 /HPF — SIGNIFICANT CHANGE UP (ref 0–4)
RH IG SCN BLD-IMP: POSITIVE — SIGNIFICANT CHANGE UP
S AUREUS DNA NOSE QL NAA+PROBE: SIGNIFICANT CHANGE UP
SODIUM SERPL-SCNC: 142 MMOL/L — SIGNIFICANT CHANGE UP (ref 135–145)
SP GR SPEC: 1.02 — SIGNIFICANT CHANGE UP (ref 1.01–1.02)
UROBILINOGEN FLD QL: SIGNIFICANT CHANGE UP
WBC # BLD: 5.67 K/UL — SIGNIFICANT CHANGE UP (ref 3.8–10.5)
WBC # FLD AUTO: 5.67 K/UL — SIGNIFICANT CHANGE UP (ref 3.8–10.5)
WBC UR QL: 2 /HPF — SIGNIFICANT CHANGE UP (ref 0–5)

## 2021-01-19 PROCEDURE — 93010 ELECTROCARDIOGRAM REPORT: CPT

## 2021-01-19 RX ORDER — SIMVASTATIN 20 MG/1
1 TABLET, FILM COATED ORAL
Qty: 0 | Refills: 0 | DISCHARGE

## 2021-01-19 RX ORDER — ASPIRIN/CALCIUM CARB/MAGNESIUM 324 MG
1 TABLET ORAL
Qty: 0 | Refills: 0 | DISCHARGE

## 2021-01-19 RX ORDER — SODIUM CHLORIDE 9 MG/ML
3 INJECTION INTRAMUSCULAR; INTRAVENOUS; SUBCUTANEOUS EVERY 8 HOURS
Refills: 0 | Status: DISCONTINUED | OUTPATIENT
Start: 2021-02-02 | End: 2021-02-05

## 2021-01-19 RX ORDER — ERGOCALCIFEROL 1.25 MG/1
1 CAPSULE ORAL
Qty: 0 | Refills: 0 | DISCHARGE

## 2021-01-19 NOTE — H&P PST ADULT - AIRWAY
10/22/2020       RE: Andi Parrish  [address] [id number]     Dear Colleague,    Thank you for referring your patient, Andi Parrish, to the [facility]UR[facility] [place] at [facility]. Please see a copy of my visit note below.    Service Date: 10/22/2020      DO David Gaona [facility]   [address], [facility] [address]   [place] [address]      RE: Andi Parrish   MRN: [id number]   : 1938      Dear Dr. Martin:      I saw Andi Parrish in neuromuscular consultation today for further evaluation of his symptoms in the hands and gait difficulty.  Mr. 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  He recalls seeing a physical therapist on one occasion for gait training and found that helpful, but did not follow up.  He now reports a chief complaint of difficulty walking, which has worsened in recent months or years.  Specifically, he indicates that his legs get heavy after walking about 1 block.  He denies radicular symptoms, but does have some postural low back pain.  He also reports gait imbalance that is present at all times.  He reports some numbness in his feet, which has been present for several years, but has not changed.  Mr. Parrish also indicates that he has had infrequent occasions of cramping of the right hand and one occasion of cramping in the left hand.      PAST MEDICAL HISTORY:  Notable for peripheral vascular disease.  He most recently underwent placement of a carotid stent 1-2 months ago.  He has not had a documented stroke.  He does not smoke.  He indicates he has about 2 alcoholic beverages every evening and drank slightly more prior to his stent 4-6 weeks ago.      PHYSICAL EXAMINATION:  General physical examination is notable for a healthy-appearing man.  Vital signs are normal.  Skin is intact.  There are no  disproportionate degenerative joint changes for a person of his age.  There is full range of motion at the cervical spine.     82 year old man     Review of Systems  No past medical history on file.   Social History     Socioeconomic History     Marital status:      Spouse name: None     Number of children: None     Years of education: None     Highest education level: None   Occupational History     None   Social Needs     Financial resource strain: None     Food insecurity     Worry: None     Inability: None     Transportation needs     Medical: None     Non-medical: None   Tobacco Use     Smoking status: Former Smoker     Years: 10.00     Smokeless tobacco: Never Used   Substance and Sexual Activity     Alcohol use: Yes     Drug use: None     Sexual activity: Never   Lifestyle     Physical activity     Days per week: None     Minutes per session: None     Stress: None   Relationships     Social connections     Talks on phone: None     Gets together: None     Attends Orthodox service: None     Active member of club or organization: None     Attends meetings of clubs or organizations: None     Relationship status: None     Intimate partner violence     Fear of current or ex partner: None     Emotionally abused: None     Physically abused: None     Forced sexual activity: None   Other Topics Concern     None   Social History Narrative     None      No family history on file.       NEUROLOGIC EXAMINATION:  As follows:     Mental state: Alert, appropriate, speech, language, and thought content normal.     Cranial nerves II-XII normal.    Sensory examination:   Right Left   Light touch Normal Normal   Vibration (timed)     Vibration (Rydell-Seiffer) 4 4   Temp     Pin Normal Normal   Pos     Legend:   MM = medial malleolus, TT = tibial tuberosity, K = patella, MCP = MCP joint  MF = mid-foot, DC = distal calf, MC = mid calf, PC = proximal calf      Motor examination:   Right Left   Shoulder abduction  5 5    Elbow extension 5 5   Elbow flexion 5 5   Wrist extension  5 5   Finger extension 5 5   FDI 5 5   APB 5 5   Hip flexion 5 5   Knee flexion 5 5   Knee extension 5 5   Dorsiflexion 5 5   Plantar flexion 5 5   A=atrophy    Tone equivocal increase left upper and lower limbs     Reflexes:   Right Left   Biceps 2 2   BRD 2 2   Triceps 2 2   Chuy 2 2   Patellar 3 3   Achilles 2+ 2+   Plantar Flexor Flexor   Clonus Absent Absent      Coordination:  Finger-nose normal.  Heel-shin mild dysmetria, R>L  RRMs trace clumsiness UEs    Gait:  Normal base and posture. Steps a bit short and choppy. Arm swing WBNL. Modest extra steps when turning. Some difficulty toe walking, more heel walking, sandra left. Great difficulty with tandem, nearly freezes. Romberg negative. Not retropulsive.    Mr. Parrish presents with a chief complaint of a gait difficulty, as well as gait limitation in the context of known and treated peripheral arterial disease.  His gait is modestly abnormal without compelling diagnostic features.  He does have unusually brisk patellar reflexes.  Prior brain MRI scans demonstrate a signal change in the basal ganglia, which is symmetric and most likely benign and possible mild atrophy of the mid-brain.      I am obtaining a cervical and brain MRI, the former for consideration of cervical canal stenosis, given his relatively brisk reflexes and slightly slow rapid repetitive movements on the left, as well as the equivocal increase in tone on the left.  The former, the brain MRI, is not likely to be substantially different than those obtained in 2017, but I am obtaining this to look for any stigmata of certain extrapyramidal syndromes, which I think somewhat unlikely.  I am obtaining a vitamin B12 level and explained to him that with an exquisitely well-preserved vibration sense in the feet, it is unlikely that the B12 deficiency is the cause of his problem, but it would be inappropriate to overlook this possibility.   Finally, given his gait limitation, it is, of course, quite possible that he has recurrent vascular or spinal claudication; however, I am taken more by the modest abnormality of his gait at rest and patellar hyperreflexia.  I could repeat lumbar imaging at a later date and certainly you or his vascular specialist is welcome to do further vascular studies if deemed appropriate.      Finally, given his previous good outcome with physical therapy, I am referring him back for balance training.  Note that I have specifically indicated that he has an MRI conditional stent on the MRI orders and have explained to him that it is very important that he bring the card with MRI information when the scan is performed.  Among the limitations are that a scanner must be no greater than 3 Larissa field strength; higher field strengths are not used in clinical practice, so I do not have a concern about this. I also spoke with a partner of his vascular surgeon, who indicated the duration of stent placement is not a concern.     I will review results with Mr. Parrish when they are available and take next steps depending upon findings.        Again, thank you for allowing me to participate in the care of your patient.  Sincerely,        MELE MCCARTY MD  D: 10/22/2020   T: 10/22/2020   MT: al      Name:     STERLING PARRISH   MRN:      [id number]        Account:      [id number]   :      1938           Service Date: 10/22/2020      Document: [id number]   normal

## 2021-01-19 NOTE — H&P PST ADULT - ASSESSMENT
Problem: unilateral primary OA  Assessment and Plan: Patient scheduled for surgery on 2/2/2021  Patient provided with verbal and written presurgical instructions; verbalized understanding  with teach back.    Patient provided with famotidine for GI prophylaxis; verbalized understanding.    Patient provided with Chlorhexidine wash, verbal and written instructions reviewed. Patient demonstrated understanding with teach back.   Patient confirmed COVID appointment     STOP BANG score met, OR booking notified  OR booking notified of implants     Echo and Stress test requested    Medical evaluation requested by PST for elevated BP and advanced age , patient verbalized understanding, will obtain    Patient instructed to stop aspirin and fish oil on 1/26/2021

## 2021-01-19 NOTE — H&P PST ADULT - MUSCULOSKELETAL
details… detailed exam no joint swelling/no joint erythema/decreased ROM/decreased ROM due to pain/diminished strength

## 2021-01-19 NOTE — H&P PST ADULT - NSICDXPASTMEDICALHX_GEN_ALL_CORE_FT
PAST MEDICAL HISTORY:  Acute diverticulitis 2/2018    medically managed; No surgery    Carpal tunnel syndrome '90's  Bilateral:  surgically treated-came back    Gallstone     GERD (gastroesophageal reflux disease)     Heart murmur mild    HLD (hyperlipidemia)     Intermittent claudication Evaluated. Cilostazol  prescribed: stopped 1 year ago    Left adrenal mass dx: 2/2018    Incidental finding during evaluation of Acute Diverticulitis.  Assymptomatic. Evaluated by Endocrinologist: followup yearly. No surgery.    Multiparity     Obese     Osteoarthritis     Vertigo intermittent.  Evaluated: meclizine prn

## 2021-01-19 NOTE — H&P PST ADULT - MOTOR
5/5 right upper extremity  4/5 right lower extremity   4/5 left upper extremity   3/5 left lower extremity

## 2021-01-19 NOTE — H&P PST ADULT - NSICDXPASTSURGICALHX_GEN_ALL_CORE_FT
PAST SURGICAL HISTORY:  S/P carpal tunnel release '90's  Bilateral, left hand redone 2020    S/P colonoscopy 4/2019     Negative    S/P endoscopy '2019    S/P total knee replacement, right

## 2021-01-20 LAB
CULTURE RESULTS: SIGNIFICANT CHANGE UP
SPECIMEN SOURCE: SIGNIFICANT CHANGE UP

## 2021-01-29 DIAGNOSIS — Z01.818 ENCOUNTER FOR OTHER PREPROCEDURAL EXAMINATION: ICD-10-CM

## 2021-01-30 ENCOUNTER — APPOINTMENT (OUTPATIENT)
Dept: DISASTER EMERGENCY | Facility: CLINIC | Age: 76
End: 2021-01-30

## 2021-01-31 LAB — SARS-COV-2 N GENE NPH QL NAA+PROBE: NOT DETECTED

## 2021-02-01 NOTE — ASU PATIENT PROFILE, ADULT - TEACHING/LEARNING EDUCATIONAL LEVEL
[Dear  ___] : Dear  [unfilled], [Courtesy Letter:] : I had the pleasure of seeing your patient, [unfilled], in my office today. [Please see my note below.] : Please see my note below. [Consult Closing:] : Thank you very much for allowing me to participate in the care of this patient.  If you have any questions, please do not hesitate to contact me. [Sincerely,] : Sincerely, [FreeTextEntry3] : Armando Trevizo MD FACS elementary

## 2021-02-01 NOTE — ASU PATIENT PROFILE, ADULT - PMH
Acute diverticulitis  2/2018    medically managed; No surgery  Carpal tunnel syndrome  '90's  Bilateral:  surgically treated-came back  Gallstone    GERD (gastroesophageal reflux disease)    Heart murmur  mild  HLD (hyperlipidemia)    Intermittent claudication  Evaluated. Cilostazol  prescribed: stopped 1 year ago  Left adrenal mass  dx: 2/2018    Incidental finding during evaluation of Acute Diverticulitis.  Assymptomatic. Evaluated by Endocrinologist: followup yearly. No surgery.  Multiparity    Obese    Osteoarthritis    Vertigo  intermittent.  Evaluated: meclizine prn

## 2021-02-01 NOTE — ASU PATIENT PROFILE, ADULT - LANGUAGE ASSISTANCE NEEDED
patient prefers for hersalejandro Szymanski to interpret for her/No-Patient/Caregiver offered and refused free interpretation services. patient prefers for hersalejandro Szymanski to interpret for her/Yes-Patient/Caregiver accepts free interpretation services...

## 2021-02-01 NOTE — ASU PATIENT PROFILE, ADULT - PSH
S/P carpal tunnel release  '90's  Bilateral, left hand redone 2020  S/P colonoscopy  4/2019     Negative  S/P endoscopy  '2019  S/P total knee replacement, right

## 2021-02-02 ENCOUNTER — TRANSCRIPTION ENCOUNTER (OUTPATIENT)
Age: 76
End: 2021-02-02

## 2021-02-02 ENCOUNTER — INPATIENT (INPATIENT)
Facility: HOSPITAL | Age: 76
LOS: 2 days | Discharge: HOME HEALTH | End: 2021-02-05
Attending: ORTHOPAEDIC SURGERY | Admitting: ORTHOPAEDIC SURGERY
Payer: MEDICARE

## 2021-02-02 VITALS
OXYGEN SATURATION: 96 % | WEIGHT: 203.93 LBS | RESPIRATION RATE: 20 BRPM | HEART RATE: 92 BPM | DIASTOLIC BLOOD PRESSURE: 75 MMHG | HEIGHT: 58 IN | SYSTOLIC BLOOD PRESSURE: 135 MMHG | TEMPERATURE: 99 F

## 2021-02-02 DIAGNOSIS — M17.12 UNILATERAL PRIMARY OSTEOARTHRITIS, LEFT KNEE: ICD-10-CM

## 2021-02-02 DIAGNOSIS — Z98.890 OTHER SPECIFIED POSTPROCEDURAL STATES: Chronic | ICD-10-CM

## 2021-02-02 DIAGNOSIS — Z96.651 PRESENCE OF RIGHT ARTIFICIAL KNEE JOINT: Chronic | ICD-10-CM

## 2021-02-02 LAB
ANION GAP SERPL CALC-SCNC: 10 MMOL/L — SIGNIFICANT CHANGE UP (ref 7–14)
BUN SERPL-MCNC: 14 MG/DL — SIGNIFICANT CHANGE UP (ref 7–23)
CALCIUM SERPL-MCNC: 9.2 MG/DL — SIGNIFICANT CHANGE UP (ref 8.4–10.5)
CHLORIDE SERPL-SCNC: 103 MMOL/L — SIGNIFICANT CHANGE UP (ref 98–107)
CO2 SERPL-SCNC: 25 MMOL/L — SIGNIFICANT CHANGE UP (ref 22–31)
CREAT SERPL-MCNC: 0.83 MG/DL — SIGNIFICANT CHANGE UP (ref 0.5–1.3)
GLUCOSE SERPL-MCNC: 151 MG/DL — HIGH (ref 70–99)
HCT VFR BLD CALC: 38.3 % — SIGNIFICANT CHANGE UP (ref 34.5–45)
HGB BLD-MCNC: 11.9 G/DL — SIGNIFICANT CHANGE UP (ref 11.5–15.5)
MCHC RBC-ENTMCNC: 29.4 PG — SIGNIFICANT CHANGE UP (ref 27–34)
MCHC RBC-ENTMCNC: 31.1 GM/DL — LOW (ref 32–36)
MCV RBC AUTO: 94.6 FL — SIGNIFICANT CHANGE UP (ref 80–100)
NRBC # BLD: 0 /100 WBCS — SIGNIFICANT CHANGE UP
NRBC # FLD: 0 K/UL — SIGNIFICANT CHANGE UP
PLATELET # BLD AUTO: 306 K/UL — SIGNIFICANT CHANGE UP (ref 150–400)
POTASSIUM SERPL-MCNC: 4.1 MMOL/L — SIGNIFICANT CHANGE UP (ref 3.5–5.3)
POTASSIUM SERPL-SCNC: 4.1 MMOL/L — SIGNIFICANT CHANGE UP (ref 3.5–5.3)
RBC # BLD: 4.05 M/UL — SIGNIFICANT CHANGE UP (ref 3.8–5.2)
RBC # FLD: 13.1 % — SIGNIFICANT CHANGE UP (ref 10.3–14.5)
SODIUM SERPL-SCNC: 138 MMOL/L — SIGNIFICANT CHANGE UP (ref 135–145)
WBC # BLD: 8.5 K/UL — SIGNIFICANT CHANGE UP (ref 3.8–10.5)
WBC # FLD AUTO: 8.5 K/UL — SIGNIFICANT CHANGE UP (ref 3.8–10.5)

## 2021-02-02 PROCEDURE — 73560 X-RAY EXAM OF KNEE 1 OR 2: CPT | Mod: 26,LT

## 2021-02-02 RX ORDER — ASPIRIN/CALCIUM CARB/MAGNESIUM 324 MG
325 TABLET ORAL
Refills: 0 | Status: DISCONTINUED | OUTPATIENT
Start: 2021-02-02 | End: 2021-02-05

## 2021-02-02 RX ORDER — SIMVASTATIN 20 MG/1
1 TABLET, FILM COATED ORAL
Qty: 0 | Refills: 0 | DISCHARGE

## 2021-02-02 RX ORDER — OMEGA-3 ACID ETHYL ESTERS 1 G
1 CAPSULE ORAL
Qty: 0 | Refills: 0 | DISCHARGE

## 2021-02-02 RX ORDER — ATORVASTATIN CALCIUM 80 MG/1
40 TABLET, FILM COATED ORAL AT BEDTIME
Refills: 0 | Status: DISCONTINUED | OUTPATIENT
Start: 2021-02-02 | End: 2021-02-05

## 2021-02-02 RX ORDER — HYDROMORPHONE HYDROCHLORIDE 2 MG/ML
0.5 INJECTION INTRAMUSCULAR; INTRAVENOUS; SUBCUTANEOUS ONCE
Refills: 0 | Status: DISCONTINUED | OUTPATIENT
Start: 2021-02-02 | End: 2021-02-05

## 2021-02-02 RX ORDER — ACETAMINOPHEN 500 MG
1000 TABLET ORAL ONCE
Refills: 0 | Status: COMPLETED | OUTPATIENT
Start: 2021-02-02 | End: 2021-02-02

## 2021-02-02 RX ORDER — CEFAZOLIN SODIUM 1 G
2000 VIAL (EA) INJECTION EVERY 8 HOURS
Refills: 0 | Status: COMPLETED | OUTPATIENT
Start: 2021-02-02 | End: 2021-02-03

## 2021-02-02 RX ORDER — OXYCODONE HYDROCHLORIDE 5 MG/1
2.5 TABLET ORAL
Refills: 0 | Status: DISCONTINUED | OUTPATIENT
Start: 2021-02-02 | End: 2021-02-05

## 2021-02-02 RX ORDER — SENNA PLUS 8.6 MG/1
2 TABLET ORAL AT BEDTIME
Refills: 0 | Status: DISCONTINUED | OUTPATIENT
Start: 2021-02-02 | End: 2021-02-05

## 2021-02-02 RX ORDER — SODIUM CHLORIDE 9 MG/ML
1000 INJECTION, SOLUTION INTRAVENOUS
Refills: 0 | Status: DISCONTINUED | OUTPATIENT
Start: 2021-02-02 | End: 2021-02-05

## 2021-02-02 RX ORDER — DEXAMETHASONE 0.5 MG/5ML
10 ELIXIR ORAL ONCE
Refills: 0 | Status: COMPLETED | OUTPATIENT
Start: 2021-02-03 | End: 2021-02-03

## 2021-02-02 RX ORDER — SODIUM CHLORIDE 9 MG/ML
500 INJECTION INTRAMUSCULAR; INTRAVENOUS; SUBCUTANEOUS ONCE
Refills: 0 | Status: COMPLETED | OUTPATIENT
Start: 2021-02-02 | End: 2021-02-02

## 2021-02-02 RX ORDER — HYDROMORPHONE HYDROCHLORIDE 2 MG/ML
1 INJECTION INTRAMUSCULAR; INTRAVENOUS; SUBCUTANEOUS
Refills: 0 | Status: DISCONTINUED | OUTPATIENT
Start: 2021-02-02 | End: 2021-02-02

## 2021-02-02 RX ORDER — PANTOPRAZOLE SODIUM 20 MG/1
40 TABLET, DELAYED RELEASE ORAL
Refills: 0 | Status: DISCONTINUED | OUTPATIENT
Start: 2021-02-03 | End: 2021-02-05

## 2021-02-02 RX ORDER — SODIUM CHLORIDE 9 MG/ML
1000 INJECTION, SOLUTION INTRAVENOUS
Refills: 0 | Status: DISCONTINUED | OUTPATIENT
Start: 2021-02-02 | End: 2021-02-02

## 2021-02-02 RX ORDER — PANTOPRAZOLE SODIUM 20 MG/1
40 TABLET, DELAYED RELEASE ORAL ONCE
Refills: 0 | Status: COMPLETED | OUTPATIENT
Start: 2021-02-02 | End: 2021-02-02

## 2021-02-02 RX ORDER — GABAPENTIN 400 MG/1
100 CAPSULE ORAL THREE TIMES A DAY
Refills: 0 | Status: DISCONTINUED | OUTPATIENT
Start: 2021-02-02 | End: 2021-02-05

## 2021-02-02 RX ORDER — ONDANSETRON 8 MG/1
4 TABLET, FILM COATED ORAL ONCE
Refills: 0 | Status: DISCONTINUED | OUTPATIENT
Start: 2021-02-02 | End: 2021-02-02

## 2021-02-02 RX ORDER — OMEPRAZOLE 10 MG/1
1 CAPSULE, DELAYED RELEASE ORAL
Qty: 0 | Refills: 0 | DISCHARGE

## 2021-02-02 RX ORDER — OXYCODONE HYDROCHLORIDE 5 MG/1
5 TABLET ORAL
Refills: 0 | Status: DISCONTINUED | OUTPATIENT
Start: 2021-02-02 | End: 2021-02-05

## 2021-02-02 RX ORDER — HYDROMORPHONE HYDROCHLORIDE 2 MG/ML
0.5 INJECTION INTRAMUSCULAR; INTRAVENOUS; SUBCUTANEOUS
Refills: 0 | Status: DISCONTINUED | OUTPATIENT
Start: 2021-02-02 | End: 2021-02-02

## 2021-02-02 RX ORDER — ONDANSETRON 8 MG/1
4 TABLET, FILM COATED ORAL EVERY 6 HOURS
Refills: 0 | Status: DISCONTINUED | OUTPATIENT
Start: 2021-02-02 | End: 2021-02-05

## 2021-02-02 RX ORDER — TRAMADOL HYDROCHLORIDE 50 MG/1
50 TABLET ORAL ONCE
Refills: 0 | Status: DISCONTINUED | OUTPATIENT
Start: 2021-02-02 | End: 2021-02-02

## 2021-02-02 RX ORDER — OMEGA-3 ACID ETHYL ESTERS 1 G
2 CAPSULE ORAL DAILY
Refills: 0 | Status: DISCONTINUED | OUTPATIENT
Start: 2021-02-02 | End: 2021-02-05

## 2021-02-02 RX ORDER — OXYCODONE HYDROCHLORIDE 5 MG/1
10 TABLET ORAL
Refills: 0 | Status: DISCONTINUED | OUTPATIENT
Start: 2021-02-02 | End: 2021-02-05

## 2021-02-02 RX ORDER — KETOROLAC TROMETHAMINE 30 MG/ML
15 SYRINGE (ML) INJECTION EVERY 6 HOURS
Refills: 0 | Status: DISCONTINUED | OUTPATIENT
Start: 2021-02-03 | End: 2021-02-03

## 2021-02-02 RX ORDER — ACETAMINOPHEN 500 MG
975 TABLET ORAL EVERY 8 HOURS
Refills: 0 | Status: DISCONTINUED | OUTPATIENT
Start: 2021-02-03 | End: 2021-02-05

## 2021-02-02 RX ORDER — GABAPENTIN 400 MG/1
100 CAPSULE ORAL ONCE
Refills: 0 | Status: COMPLETED | OUTPATIENT
Start: 2021-02-02 | End: 2021-02-02

## 2021-02-02 RX ORDER — ACETAMINOPHEN 500 MG
975 TABLET ORAL ONCE
Refills: 0 | Status: COMPLETED | OUTPATIENT
Start: 2021-02-02 | End: 2021-02-02

## 2021-02-02 RX ORDER — TRAMADOL HYDROCHLORIDE 50 MG/1
50 TABLET ORAL EVERY 8 HOURS
Refills: 0 | Status: DISCONTINUED | OUTPATIENT
Start: 2021-02-02 | End: 2021-02-05

## 2021-02-02 RX ORDER — MAGNESIUM HYDROXIDE 400 MG/1
30 TABLET, CHEWABLE ORAL DAILY
Refills: 0 | Status: DISCONTINUED | OUTPATIENT
Start: 2021-02-02 | End: 2021-02-05

## 2021-02-02 RX ADMIN — Medication 325 MILLIGRAM(S): at 18:52

## 2021-02-02 RX ADMIN — SODIUM CHLORIDE 75 MILLILITER(S): 9 INJECTION, SOLUTION INTRAVENOUS at 16:28

## 2021-02-02 RX ADMIN — ATORVASTATIN CALCIUM 40 MILLIGRAM(S): 80 TABLET, FILM COATED ORAL at 22:25

## 2021-02-02 RX ADMIN — SODIUM CHLORIDE 500 MILLILITER(S): 9 INJECTION INTRAMUSCULAR; INTRAVENOUS; SUBCUTANEOUS at 19:45

## 2021-02-02 RX ADMIN — Medication 400 MILLIGRAM(S): at 18:51

## 2021-02-02 RX ADMIN — Medication 100 MILLIGRAM(S): at 17:23

## 2021-02-02 RX ADMIN — Medication 975 MILLIGRAM(S): at 07:04

## 2021-02-02 RX ADMIN — GABAPENTIN 100 MILLIGRAM(S): 400 CAPSULE ORAL at 17:23

## 2021-02-02 RX ADMIN — GABAPENTIN 100 MILLIGRAM(S): 400 CAPSULE ORAL at 22:25

## 2021-02-02 RX ADMIN — PANTOPRAZOLE SODIUM 40 MILLIGRAM(S): 20 TABLET, DELAYED RELEASE ORAL at 07:04

## 2021-02-02 RX ADMIN — OXYCODONE HYDROCHLORIDE 10 MILLIGRAM(S): 5 TABLET ORAL at 19:50

## 2021-02-02 RX ADMIN — TRAMADOL HYDROCHLORIDE 50 MILLIGRAM(S): 50 TABLET ORAL at 07:04

## 2021-02-02 RX ADMIN — SODIUM CHLORIDE 500 MILLILITER(S): 9 INJECTION INTRAMUSCULAR; INTRAVENOUS; SUBCUTANEOUS at 13:00

## 2021-02-02 RX ADMIN — SENNA PLUS 2 TABLET(S): 8.6 TABLET ORAL at 22:25

## 2021-02-02 RX ADMIN — HYDROMORPHONE HYDROCHLORIDE 0.5 MILLIGRAM(S): 2 INJECTION INTRAMUSCULAR; INTRAVENOUS; SUBCUTANEOUS at 14:36

## 2021-02-02 RX ADMIN — GABAPENTIN 100 MILLIGRAM(S): 400 CAPSULE ORAL at 07:05

## 2021-02-02 RX ADMIN — SODIUM CHLORIDE 3 MILLILITER(S): 9 INJECTION INTRAMUSCULAR; INTRAVENOUS; SUBCUTANEOUS at 22:11

## 2021-02-02 RX ADMIN — SODIUM CHLORIDE 30 MILLILITER(S): 9 INJECTION, SOLUTION INTRAVENOUS at 07:04

## 2021-02-02 RX ADMIN — ONDANSETRON 4 MILLIGRAM(S): 8 TABLET, FILM COATED ORAL at 17:23

## 2021-02-02 NOTE — OCCUPATIONAL THERAPY INITIAL EVALUATION ADULT - DIAGNOSIS, OT EVAL
s/p Right Total Knee Replacement; Decreased standing balance; Decreased functional mobility; Decreased ADL management

## 2021-02-02 NOTE — OCCUPATIONAL THERAPY INITIAL EVALUATION ADULT - PERTINENT HX OF CURRENT PROBLEM, REHAB EVAL
Pt is a 75 year old female with hx of HLD, GERD, OA, & left severe knee pain radiating to calf muscle, x-rays noted OA. Pt is now s/p Right Total Knee Replacement on 2/2/21.

## 2021-02-02 NOTE — PHYSICAL THERAPY INITIAL EVALUATION ADULT - THERAPY FREQUENCY, PT EVAL
Anesthesia Evaluation                  Airway   Mallampati: I  TM distance: >3 FB  Neck ROM: full  No difficulty expected  Dental      Pulmonary    (+) lung cancer, COPD, sleep apnea,   Cardiovascular     (+) valvular problems/murmurs,       Neuro/Psych  GI/Hepatic/Renal/Endo    (+)  hiatal hernia, GERD,  diabetes mellitus,     Musculoskeletal     Abdominal    Substance History      OB/GYN          Other                        Anesthesia Plan    ASA 3     general     intravenous induction     Anesthetic plan, all risks, benefits, and alternatives have been provided, discussed and informed consent has been obtained with: patient.    Plan discussed with CRNA.      
BID/daily

## 2021-02-02 NOTE — PATIENT PROFILE ADULT - LANGUAGE ASSISTANCE NEEDED
patient prefers for hersalejandro Szymanski to interpret for her/Yes-Patient/Caregiver accepts free interpretation services...

## 2021-02-02 NOTE — PHYSICAL THERAPY INITIAL EVALUATION ADULT - LEVEL OF INDEPENDENCE: GAIT, REHAB EVAL
not tested due to pt with episode of vomiting. Assisted pt back to bed.  Appeared that symptoms were resolving- ZENA Pierre made aware.

## 2021-02-02 NOTE — OCCUPATIONAL THERAPY INITIAL EVALUATION ADULT - IMPAIRED TRANSFERS: SIT/STAND, REHAB EVAL
+nausea and vomiting. ZENA Pierre made aware. Pt returned semisupine in bed. No acute distress. Call bell in reach. All lines intact & all precautions maintained. RN aware./impaired balance/decreased strength

## 2021-02-02 NOTE — DISCHARGE NOTE NURSING/CASE MANAGEMENT/SOCIAL WORK - PATIENT PORTAL LINK FT
You can access the FollowMyHealth Patient Portal offered by [facility] by registering at the following website: [email or web address]. By joining [facility]’s FollowMyHealth portal, you will also be able to view your health information using other applications (apps) compatible with our system.

## 2021-02-02 NOTE — PHYSICAL THERAPY INITIAL EVALUATION ADULT - GENERAL OBSERVATIONS, REHAB EVAL
Pt seen lying in bed, + ace wrap Left leg, + IV. Pt c/o nausea but was agreeable to participate in PT evaluation. Pt seen lying in bed, + ace wrap Left leg, + IV, O2 n/c. Pt c/o nausea but was agreeable to participate in PT evaluation. Pt seen lying in bed, + ace wrap Left leg, + IV, O2 n/c. Pt c/o nausea but was agreeable to participate in PT evaluation. Pt.'s primary language is Palestinian.  phone utilized (: Clotilde #[id number])

## 2021-02-02 NOTE — DISCHARGE NOTE NURSING/CASE MANAGEMENT/SOCIAL WORK - NSDCPECAREGIVERED_GEN_ALL_CORE
carenotes on knee replacement, managing pain at home handout, carenotes on d/c medications, exercises worksheet, FORCE and side effects pamphlet

## 2021-02-02 NOTE — OCCUPATIONAL THERAPY INITIAL EVALUATION ADULT - MD ORDER
Occupational Therapy (OT) to evaluate and treat. Occupational Therapy (OT) to evaluate and treat. Per ZENA GRAMAJO, pt is okay to participate in OT evaluation and perform activity as tolerated.

## 2021-02-02 NOTE — OCCUPATIONAL THERAPY INITIAL EVALUATION ADULT - LIVES WITH, PROFILE
Pt. reports she lives with her sister in an apartment within a 2-family house with no steps to enter. Once inside, pt. reports she has full flight of steps to negotiate to 2nd floor where apartment is located. Per pt., she has a bathtub in her bathroom with grab bar available.

## 2021-02-02 NOTE — OCCUPATIONAL THERAPY INITIAL EVALUATION ADULT - GENERAL OBSERVATIONS, REHAB EVAL
Pt. received semisupine in bed. No acute distress. Patient agreed to evaluation from Occupational Therapist. +Ace Wrap to Left Knee, +IV, +Pulse ox to Right finger, +O2 via nasal cannula, +Left foot pump, +Right Venodyne. Pt.'s primary language is Yi.  phone utilized (: Clotilde #[id number])

## 2021-02-02 NOTE — PHYSICAL THERAPY INITIAL EVALUATION ADULT - RANGE OF MOTION EXAMINATION, REHAB EVAL
Left knee flexion 0-45 degrees, limited due to pain/bilateral upper extremity ROM was WFL (within functional limits)/Right LE ROM was WFL (within functional limits)

## 2021-02-02 NOTE — DISCHARGE NOTE NURSING/CASE MANAGEMENT/SOCIAL WORK - NSDCPNINST_GEN_ALL_CORE
You have a postop appointment with Dr Mckoy on, please keep postop dressing in place until this appointment.  Notify Dr Mckoy if you experience any increase in pain not relieved with medication, any redness, drainage or swelling around incision or any fever >100.5.  Drink plenty of fluids.  No heavy lifting or straining.  Continue to do exercises and elevate your leg as instructed.  Use over the counter stool softeners to assist with constipation which can be a side effect of narcotic pain medication.

## 2021-02-03 LAB
ANION GAP SERPL CALC-SCNC: 10 MMOL/L — SIGNIFICANT CHANGE UP (ref 7–14)
BUN SERPL-MCNC: 12 MG/DL — SIGNIFICANT CHANGE UP (ref 7–23)
CALCIUM SERPL-MCNC: 9.3 MG/DL — SIGNIFICANT CHANGE UP (ref 8.4–10.5)
CHLORIDE SERPL-SCNC: 104 MMOL/L — SIGNIFICANT CHANGE UP (ref 98–107)
CO2 SERPL-SCNC: 25 MMOL/L — SIGNIFICANT CHANGE UP (ref 22–31)
CREAT SERPL-MCNC: 0.71 MG/DL — SIGNIFICANT CHANGE UP (ref 0.5–1.3)
GLUCOSE SERPL-MCNC: 126 MG/DL — HIGH (ref 70–99)
HCT VFR BLD CALC: 35.3 % — SIGNIFICANT CHANGE UP (ref 34.5–45)
HGB BLD-MCNC: 10.8 G/DL — LOW (ref 11.5–15.5)
MCHC RBC-ENTMCNC: 29.4 PG — SIGNIFICANT CHANGE UP (ref 27–34)
MCHC RBC-ENTMCNC: 30.6 GM/DL — LOW (ref 32–36)
MCV RBC AUTO: 96.2 FL — SIGNIFICANT CHANGE UP (ref 80–100)
NRBC # BLD: 0 /100 WBCS — SIGNIFICANT CHANGE UP
NRBC # FLD: 0 K/UL — SIGNIFICANT CHANGE UP
PLATELET # BLD AUTO: 281 K/UL — SIGNIFICANT CHANGE UP (ref 150–400)
POTASSIUM SERPL-MCNC: 4.8 MMOL/L — SIGNIFICANT CHANGE UP (ref 3.5–5.3)
POTASSIUM SERPL-SCNC: 4.8 MMOL/L — SIGNIFICANT CHANGE UP (ref 3.5–5.3)
RBC # BLD: 3.67 M/UL — LOW (ref 3.8–5.2)
RBC # FLD: 13.1 % — SIGNIFICANT CHANGE UP (ref 10.3–14.5)
SODIUM SERPL-SCNC: 139 MMOL/L — SIGNIFICANT CHANGE UP (ref 135–145)
WBC # BLD: 10.71 K/UL — HIGH (ref 3.8–10.5)
WBC # FLD AUTO: 10.71 K/UL — HIGH (ref 3.8–10.5)

## 2021-02-03 RX ADMIN — Medication 975 MILLIGRAM(S): at 18:54

## 2021-02-03 RX ADMIN — Medication 102 MILLIGRAM(S): at 01:39

## 2021-02-03 RX ADMIN — Medication 1 TABLET(S): at 14:13

## 2021-02-03 RX ADMIN — GABAPENTIN 100 MILLIGRAM(S): 400 CAPSULE ORAL at 21:31

## 2021-02-03 RX ADMIN — Medication 325 MILLIGRAM(S): at 18:54

## 2021-02-03 RX ADMIN — GABAPENTIN 100 MILLIGRAM(S): 400 CAPSULE ORAL at 06:16

## 2021-02-03 RX ADMIN — Medication 15 MILLIGRAM(S): at 12:09

## 2021-02-03 RX ADMIN — Medication 102 MILLIGRAM(S): at 07:42

## 2021-02-03 RX ADMIN — Medication 325 MILLIGRAM(S): at 06:16

## 2021-02-03 RX ADMIN — ATORVASTATIN CALCIUM 40 MILLIGRAM(S): 80 TABLET, FILM COATED ORAL at 21:31

## 2021-02-03 RX ADMIN — SODIUM CHLORIDE 3 MILLILITER(S): 9 INJECTION INTRAMUSCULAR; INTRAVENOUS; SUBCUTANEOUS at 05:55

## 2021-02-03 RX ADMIN — SODIUM CHLORIDE 3 MILLILITER(S): 9 INJECTION INTRAMUSCULAR; INTRAVENOUS; SUBCUTANEOUS at 21:19

## 2021-02-03 RX ADMIN — SENNA PLUS 2 TABLET(S): 8.6 TABLET ORAL at 21:31

## 2021-02-03 RX ADMIN — GABAPENTIN 100 MILLIGRAM(S): 400 CAPSULE ORAL at 14:13

## 2021-02-03 RX ADMIN — PANTOPRAZOLE SODIUM 40 MILLIGRAM(S): 20 TABLET, DELAYED RELEASE ORAL at 06:16

## 2021-02-03 RX ADMIN — Medication 975 MILLIGRAM(S): at 12:09

## 2021-02-03 RX ADMIN — Medication 15 MILLIGRAM(S): at 18:54

## 2021-02-03 RX ADMIN — Medication 15 MILLIGRAM(S): at 06:16

## 2021-02-03 RX ADMIN — Medication 2 GRAM(S): at 14:13

## 2021-02-03 RX ADMIN — SODIUM CHLORIDE 3 MILLILITER(S): 9 INJECTION INTRAMUSCULAR; INTRAVENOUS; SUBCUTANEOUS at 14:12

## 2021-02-03 RX ADMIN — Medication 100 MILLIGRAM(S): at 00:29

## 2021-02-03 RX ADMIN — Medication 15 MILLIGRAM(S): at 00:28

## 2021-02-04 ENCOUNTER — TRANSCRIPTION ENCOUNTER (OUTPATIENT)
Age: 76
End: 2021-02-04

## 2021-02-04 LAB
ANION GAP SERPL CALC-SCNC: 9 MMOL/L — SIGNIFICANT CHANGE UP (ref 7–14)
BUN SERPL-MCNC: 21 MG/DL — SIGNIFICANT CHANGE UP (ref 7–23)
CALCIUM SERPL-MCNC: 9.4 MG/DL — SIGNIFICANT CHANGE UP (ref 8.4–10.5)
CHLORIDE SERPL-SCNC: 104 MMOL/L — SIGNIFICANT CHANGE UP (ref 98–107)
CO2 SERPL-SCNC: 27 MMOL/L — SIGNIFICANT CHANGE UP (ref 22–31)
CREAT SERPL-MCNC: 0.79 MG/DL — SIGNIFICANT CHANGE UP (ref 0.5–1.3)
GLUCOSE SERPL-MCNC: 85 MG/DL — SIGNIFICANT CHANGE UP (ref 70–99)
HCT VFR BLD CALC: 34.1 % — LOW (ref 34.5–45)
HGB BLD-MCNC: 10.4 G/DL — LOW (ref 11.5–15.5)
MCHC RBC-ENTMCNC: 29.5 PG — SIGNIFICANT CHANGE UP (ref 27–34)
MCHC RBC-ENTMCNC: 30.5 GM/DL — LOW (ref 32–36)
MCV RBC AUTO: 96.9 FL — SIGNIFICANT CHANGE UP (ref 80–100)
NRBC # BLD: 0 /100 WBCS — SIGNIFICANT CHANGE UP
NRBC # FLD: 0 K/UL — SIGNIFICANT CHANGE UP
PLATELET # BLD AUTO: 272 K/UL — SIGNIFICANT CHANGE UP (ref 150–400)
POTASSIUM SERPL-MCNC: 4.4 MMOL/L — SIGNIFICANT CHANGE UP (ref 3.5–5.3)
POTASSIUM SERPL-SCNC: 4.4 MMOL/L — SIGNIFICANT CHANGE UP (ref 3.5–5.3)
RBC # BLD: 3.52 M/UL — LOW (ref 3.8–5.2)
RBC # FLD: 13.4 % — SIGNIFICANT CHANGE UP (ref 10.3–14.5)
SODIUM SERPL-SCNC: 140 MMOL/L — SIGNIFICANT CHANGE UP (ref 135–145)
WBC # BLD: 11.69 K/UL — HIGH (ref 3.8–10.5)
WBC # FLD AUTO: 11.69 K/UL — HIGH (ref 3.8–10.5)

## 2021-02-04 RX ADMIN — SODIUM CHLORIDE 3 MILLILITER(S): 9 INJECTION INTRAMUSCULAR; INTRAVENOUS; SUBCUTANEOUS at 13:21

## 2021-02-04 RX ADMIN — SENNA PLUS 2 TABLET(S): 8.6 TABLET ORAL at 21:15

## 2021-02-04 RX ADMIN — Medication 325 MILLIGRAM(S): at 17:47

## 2021-02-04 RX ADMIN — GABAPENTIN 100 MILLIGRAM(S): 400 CAPSULE ORAL at 21:16

## 2021-02-04 RX ADMIN — ATORVASTATIN CALCIUM 40 MILLIGRAM(S): 80 TABLET, FILM COATED ORAL at 21:15

## 2021-02-04 RX ADMIN — OXYCODONE HYDROCHLORIDE 10 MILLIGRAM(S): 5 TABLET ORAL at 10:07

## 2021-02-04 RX ADMIN — Medication 975 MILLIGRAM(S): at 02:01

## 2021-02-04 RX ADMIN — OXYCODONE HYDROCHLORIDE 10 MILLIGRAM(S): 5 TABLET ORAL at 17:47

## 2021-02-04 RX ADMIN — GABAPENTIN 100 MILLIGRAM(S): 400 CAPSULE ORAL at 14:53

## 2021-02-04 RX ADMIN — SODIUM CHLORIDE 3 MILLILITER(S): 9 INJECTION INTRAMUSCULAR; INTRAVENOUS; SUBCUTANEOUS at 05:09

## 2021-02-04 RX ADMIN — OXYCODONE HYDROCHLORIDE 10 MILLIGRAM(S): 5 TABLET ORAL at 05:53

## 2021-02-04 RX ADMIN — Medication 2 GRAM(S): at 10:07

## 2021-02-04 RX ADMIN — GABAPENTIN 100 MILLIGRAM(S): 400 CAPSULE ORAL at 05:27

## 2021-02-04 RX ADMIN — SODIUM CHLORIDE 3 MILLILITER(S): 9 INJECTION INTRAMUSCULAR; INTRAVENOUS; SUBCUTANEOUS at 21:15

## 2021-02-04 RX ADMIN — PANTOPRAZOLE SODIUM 40 MILLIGRAM(S): 20 TABLET, DELAYED RELEASE ORAL at 05:27

## 2021-02-04 RX ADMIN — Medication 975 MILLIGRAM(S): at 17:46

## 2021-02-04 RX ADMIN — Medication 975 MILLIGRAM(S): at 10:07

## 2021-02-04 RX ADMIN — Medication 1 TABLET(S): at 10:07

## 2021-02-04 RX ADMIN — Medication 325 MILLIGRAM(S): at 05:27

## 2021-02-04 NOTE — PROGRESS NOTE ADULT - ASSESSMENT
A/P  s/p Knee Arthroplasty, POD #2    - Pain control/ Analgesia  - DVT ppx ASA 325bid; foot pumps  - PT/OT - wbat/oob    - Incentive Spirometer  - Discharge planning    - notify Ortho for questions

## 2021-02-04 NOTE — DISCHARGE NOTE PROVIDER - NSDCCPCAREPLAN_GEN_ALL_CORE_FT
PRINCIPAL DISCHARGE DIAGNOSIS  Diagnosis: Primary osteoarthritis of left knee  Assessment and Plan of Treatment: 75year old female is admitted for elective Left total knee arthroplasty 2/2/2021 without any intraoperative complications.  Patient is doing well and stable for discharge.  Patient is tolerating physical therapy: weight bearing to Left leg, gait training, exercises as shown by Physical Therapy.  Keep Aquacel wound dressing on as is until day of office visit.  Staples/sutures if applicable, to be removed in the office 14 days from date of surgery.  Patient is on Aspirin (MUST TAKE WITH FOOD) for anticoagulation.  Orthopaedic Surgeon Dr. Mckoy would like patient to call private MD/Internist for appointment postop to maintain continuity of care.  Follow up with Dr. Mckoy's office in 1-2 weeks.  Istop reviewed

## 2021-02-04 NOTE — DISCHARGE NOTE PROVIDER - NSDCCPTREATMENT_GEN_ALL_CORE_FT
PRINCIPAL PROCEDURE  Procedure: Left total knee replacement  Findings and Treatment: dictated operative note  weight bearing as tolerated to Left leg  out of bed for gait training  call Surgeon's office to make appointment for 1-2 weeks from date of surgery Dr. Mckoy [phone]

## 2021-02-04 NOTE — DISCHARGE NOTE PROVIDER - NSDCMRMEDTOKEN_GEN_ALL_CORE_FT
aspirin 81 mg oral tablet: 1 tab(s) orally once a day. Last dose 1/26/21  calcium 500+Vitamin D3: 1 tab(s) orally once a day  Omega-3 1000 mg oral capsule: 1 cap(s) orally once a day. Last dose 1/26/21  omeprazole 20 mg oral delayed release capsule: 1 cap(s) orally once a day  simvastatin 10 mg oral tablet: 1 tab(s) orally once a day  Vitamin B Complex: 1 tab(s) orally once a day   acetaminophen 325 mg oral tablet: 3 tab(s) orally every 8 hours standing around the clock until follow up with Ortho Surgeon MDD:TWELVE TABS  aspirin 325 mg oral delayed release tablet: 1 tab(s) orally 2 times a day (with meals)   calcium 500+Vitamin D3: 1 tab(s) orally once a day  Colace 100 mg oral capsule: 1 cap(s) orally 3 times a day   gabapentin 100 mg oral capsule: 1 cap(s) orally 3 times a day standing around the clock  begin tapering off as pain decreases MDD:THREE CAPS  Omega-3 1000 mg oral capsule: 1 cap(s) orally once a day. Last dose 1/26/21  omeprazole 20 mg oral delayed release capsule: 1 cap(s) orally once a day  oxyCODONE 5 mg oral tablet: 1 tab for Moderate pain or 2 tabs for Severe pain as needed orally every 4-6 hours  begin tapering off as pain decreases MDD:EIGHT TABS  senna oral tablet: 2 tab(s) orally once a day (at bedtime)  simvastatin 10 mg oral tablet: 1 tab(s) orally once a day  traMADol 50 mg oral tablet: 1 tab(s) orally every 8 hours as needed for Mild pain  begin tapering off as pain decreases MDD:THREE TABS  Vitamin B Complex: 1 tab(s) orally once a day

## 2021-02-04 NOTE — DISCHARGE NOTE PROVIDER - NSDCHHNEEDSERVICE_GEN_ALL_CORE
Observation and assessment/Teaching and training Rehabilitation services/Teaching and training/Wound care and assessment

## 2021-02-04 NOTE — DISCHARGE NOTE PROVIDER - HOSPITAL COURSE
75year old female is admitted for elective Left total knee arthroplasty 2/2/2021 without any intraoperative complications.  Patient is doing well and stable for discharge.  Patient is tolerating physical therapy: weight bearing to Left leg, gait training, exercises as shown by Physical Therapy.  Keep Aquacel wound dressing on as is until day of office visit.  Staples/sutures if applicable, to be removed in the office 14 days from date of surgery.  Patient is on Aspirin (MUST TAKE WITH FOOD) for anticoagulation.  Orthopaedic Surgeon Dr. Mckoy would like patient to call private MD/Internist for appointment postop to maintain continuity of care.  Follow up with Dr. Mckoy's office in 1-2 weeks.    Istop reviewed

## 2021-02-04 NOTE — DISCHARGE NOTE PROVIDER - CARE PROVIDER_API CALL
Roberto Mckoy  ORTHOPAEDIC SURGERY  [address]  [place] [address]  Phone: [phone]  Fax: [phone]  Established Patient  Follow Up Time: 2 weeks

## 2021-02-04 NOTE — DISCHARGE NOTE PROVIDER - NSDCFUADDINST_GEN_ALL_CORE_FT
dressing is water resistant but still keep direct stream of water away from the dressing  no swimming or bathing where wound is exposed to water for long periods of time

## 2021-02-05 VITALS
SYSTOLIC BLOOD PRESSURE: 147 MMHG | RESPIRATION RATE: 16 BRPM | HEART RATE: 90 BPM | DIASTOLIC BLOOD PRESSURE: 71 MMHG | OXYGEN SATURATION: 99 % | TEMPERATURE: 98 F

## 2021-02-05 RX ORDER — SENNA PLUS 8.6 MG/1
2 TABLET ORAL
Qty: 60 | Refills: 0
Start: 2021-02-05 | End: 2021-03-06

## 2021-02-05 RX ORDER — GABAPENTIN 400 MG/1
1 CAPSULE ORAL
Qty: 90 | Refills: 0
Start: 2021-02-05 | End: 2021-03-06

## 2021-02-05 RX ORDER — ASPIRIN/CALCIUM CARB/MAGNESIUM 324 MG
1 TABLET ORAL
Qty: 0 | Refills: 0 | DISCHARGE

## 2021-02-05 RX ORDER — DOCUSATE SODIUM 100 MG
1 CAPSULE ORAL
Qty: 90 | Refills: 0
Start: 2021-02-05 | End: 2021-03-06

## 2021-02-05 RX ORDER — ASPIRIN/CALCIUM CARB/MAGNESIUM 324 MG
1 TABLET ORAL
Qty: 84 | Refills: 0
Start: 2021-02-05 | End: 2021-03-18

## 2021-02-05 RX ORDER — OXYCODONE HYDROCHLORIDE 5 MG/1
2 TABLET ORAL
Qty: 56 | Refills: 0
Start: 2021-02-05 | End: 2021-02-11

## 2021-02-05 RX ORDER — ACETAMINOPHEN 500 MG
3 TABLET ORAL
Qty: 63 | Refills: 3
Start: 2021-02-05 | End: 2021-03-04

## 2021-02-05 RX ORDER — TRAMADOL HYDROCHLORIDE 50 MG/1
1 TABLET ORAL
Qty: 21 | Refills: 0
Start: 2021-02-05 | End: 2021-02-11

## 2021-02-05 RX ADMIN — PANTOPRAZOLE SODIUM 40 MILLIGRAM(S): 20 TABLET, DELAYED RELEASE ORAL at 05:33

## 2021-02-05 RX ADMIN — OXYCODONE HYDROCHLORIDE 10 MILLIGRAM(S): 5 TABLET ORAL at 13:51

## 2021-02-05 RX ADMIN — Medication 975 MILLIGRAM(S): at 09:39

## 2021-02-05 RX ADMIN — Medication 975 MILLIGRAM(S): at 02:30

## 2021-02-05 RX ADMIN — OXYCODONE HYDROCHLORIDE 10 MILLIGRAM(S): 5 TABLET ORAL at 09:38

## 2021-02-05 RX ADMIN — SODIUM CHLORIDE 3 MILLILITER(S): 9 INJECTION INTRAMUSCULAR; INTRAVENOUS; SUBCUTANEOUS at 05:32

## 2021-02-05 RX ADMIN — GABAPENTIN 100 MILLIGRAM(S): 400 CAPSULE ORAL at 05:33

## 2021-02-05 RX ADMIN — Medication 325 MILLIGRAM(S): at 05:33

## 2021-02-05 RX ADMIN — GABAPENTIN 100 MILLIGRAM(S): 400 CAPSULE ORAL at 13:50

## 2021-02-05 RX ADMIN — Medication 1 TABLET(S): at 13:50

## 2021-02-05 NOTE — PROGRESS NOTE ADULT - SUBJECTIVE AND OBJECTIVE BOX
MEHUL GIBBONS  [id number]  02-03-21 @ 08:35      S:s/p TKR. The patient reports pain control is good.    O: The patient is alert. Examination shows alignment looks good, sensation is intact, motor function is intact, DPP is 2+. There is no calf pain.     A: The patient is doing well without complications.    P:  The patient will have physical therapy and will be discharged when cleared by P.T. Plan at this time is for discharge to home.    Roberto Mckoy M.D.
ANESTHESIA POSTOP CHECK    75y Female POSTOP DAY 1      Vital Signs Last 24 Hrs  T(C): 36.8 (03 Feb 2021 10:02), Max: 36.9 (03 Feb 2021 01:06)  T(F): 98.2 (03 Feb 2021 10:02), Max: 98.4 (03 Feb 2021 01:06)  HR: 78 (03 Feb 2021 10:02) (69 - 93)  BP: 117/53 (03 Feb 2021 10:02) (104/51 - 125/57)  BP(mean): 72 (02 Feb 2021 15:30) (68 - 75)  RR: 16 (03 Feb 2021 10:02) (11 - 19)  SpO2: 95% (03 Feb 2021 10:02) (94% - 100%)  I&O's Summary    02 Feb 2021 07:01  -  03 Feb 2021 07:00  --------------------------------------------------------  IN: 695 mL / OUT: 1000 mL / NET: -305 mL    03 Feb 2021 07:01  -  03 Feb 2021 10:39  --------------------------------------------------------  IN: 0 mL / OUT: 200 mL / NET: -200 mL        [x ] NO APPARENT ANESTHESIA COMPLICATIONS      Comments: 
ORTHO PROGRESS NOTE     Pt seen and examined at bedside, denies SOB, CP, Dizziness. N/V/D /HA.  No significant overnight events. Pain well controlled.     Vital Signs Last 24 Hrs  T(C): 36.7 (03 Feb 2021 05:39), Max: 36.9 (03 Feb 2021 01:06)  T(F): 98.1 (03 Feb 2021 05:39), Max: 98.4 (03 Feb 2021 01:06)  HR: 79 (03 Feb 2021 05:39) (69 - 93)  BP: 125/57 (03 Feb 2021 05:39) (104/51 - 125/57)  BP(mean): 72 (02 Feb 2021 15:30) (68 - 75)  RR: 16 (03 Feb 2021 05:39) (11 - 19)  SpO2: 100% (03 Feb 2021 05:39) (94% - 100%)    Gen: No apparent distress    left lower extremity :  Dressing C/D I    BLE: motor intact EHL/FHL/TA/GS .  Sensation is grossly intact to light touch in the distal extremities.  Compartments are soft bilaterally.  Extremities are warm .  DP 2+ BLE     Labs:  CBC Full  -  ( 02 Feb 2021 13:13 )  WBC Count : 8.50 K/uL  RBC Count : 4.05 M/uL  Hemoglobin : 11.9 g/dL  Hematocrit : 38.3 %  Platelet Count - Automated : 306 K/uL  Mean Cell Volume : 94.6 fL  Mean Cell Hemoglobin : 29.4 pg  Mean Cell Hemoglobin Concentration : 31.1 gm/dL  Auto Neutrophil # : x  Auto Lymphocyte # : x  Auto Monocyte # : x  Auto Eosinophil # : x  Auto Basophil # : x  Auto Neutrophil % : x  Auto Lymphocyte % : x  Auto Monocyte % : x  Auto Eosinophil % : x  Auto Basophil % : x        02-02    138  |  103  |  14  ----------------------------<  151<H>  4.1   |  25  |  0.83    Ca    9.2      02 Feb 2021 13:13           A/P  s/p Knee Arthroplasty, POD #1    - Pain control/ Analgesia  - DVT ppx ASA 325bidfoot pumps  - PT/OT - wbat/oob    - Incentive Spirometer  - Discharge planning    - notify Ortho for questions 
ORTHOPAEDCICS DAILY PROGRESS NOTE:       SUBJECTIVE/ROS: POD2 s/p L mTKA. No events overnight. Seen and examined this AM at bedside. States pain is well controlled. Denies CP/SOB/N/V.          MEDICATIONS  (STANDING):  acetaminophen   Tablet .. 975 milliGRAM(s) Oral every 8 hours  aspirin enteric coated 325 milliGRAM(s) Oral two times a day  atorvastatin 40 milliGRAM(s) Oral at bedtime  gabapentin 100 milliGRAM(s) Oral three times a day  HYDROmorphone  Injectable 0.5 milliGRAM(s) IV Push once  lactated ringers. 1000 milliLiter(s) (75 mL/Hr) IV Continuous <Continuous>  multivitamin 1 Tablet(s) Oral daily  omega-3-Acid Ethyl Esters 2 Gram(s) Oral daily  pantoprazole    Tablet 40 milliGRAM(s) Oral before breakfast  senna 2 Tablet(s) Oral at bedtime  sodium chloride 0.9% lock flush 3 milliLiter(s) IV Push every 8 hours    MEDICATIONS  (PRN):  magnesium hydroxide Suspension 30 milliLiter(s) Oral daily PRN Constipation  ondansetron Injectable 4 milliGRAM(s) IV Push every 6 hours PRN Nausea and/or Vomiting  oxyCODONE    IR 2.5 milliGRAM(s) Oral every 3 hours PRN Mild Pain (1 - 3)  oxyCODONE    IR 5 milliGRAM(s) Oral every 3 hours PRN Moderate Pain (4 - 6)  oxyCODONE    IR 10 milliGRAM(s) Oral every 3 hours PRN Severe Pain (7 - 10)  traMADol 50 milliGRAM(s) Oral every 8 hours PRN Mild Pain (1 - 3)      OBJECTIVE:    Vital Signs Last 24 Hrs  T(C): 37.1 (2021 05:06), Max: 37.1 (2021 05:06)  T(F): 98.7 (2021 05:06), Max: 98.7 (2021 05:06)  HR: 79 (2021 05:06) (70 - 87)  BP: 114/74 (2021 05:06) (105/60 - 117/53)  BP(mean): --  RR: 17 (2021 05:06) (15 - 19)  SpO2: 97% (2021 05:06) (95% - 98%)    I&O's Detail    2021 07:01  -  2021 07:00  --------------------------------------------------------  IN:    Lactated Ringers: 75 mL    Oral Fluid: 120 mL    Sodium Chloride 0.9% Bolus: 500 mL  Total IN: 695 mL    OUT:    Voided (mL): 1000 mL  Total OUT: 1000 mL    Total NET: -305 mL      2021 07:01  -  2021 06:54  --------------------------------------------------------  IN:  Total IN: 0 mL    OUT:    Voided (mL): 950 mL  Total OUT: 950 mL    Total NET: -950 mL          Daily     Daily Weight in k.6 (2021 01:23)    LABS:                        10.8   10.71 )-----------( 281      ( 2021 07:31 )             35.3     02-03    139  |  104  |  12  ----------------------------<  126<H>  4.8   |  25  |  0.71    Ca    9.3      2021 07:31                    PHYSICAL EXAM:  Gen: NAD,  LLE: Dressing C/D I    motor intact EHL/FHL/TA/GS .  SILT S/S/SP/DP/T,  Extremities are warm .  DP 2+     
Ortho Post-Op    Patient is seen and examined at bedside. Denies CP/SOB/Dizziness. Pain is controlled.     Vital Signs Last 24 Hrs  T(C): 36.4 (02 Feb 2021 12:50), Max: 37.1 (02 Feb 2021 06:20)  T(F): 97.5 (02 Feb 2021 12:50), Max: 98.8 (02 Feb 2021 06:20)  HR: 93 (02 Feb 2021 13:05) (86 - 93)  BP: 113/62 (02 Feb 2021 13:05) (108/57 - 135/75)  BP(mean): 75 (02 Feb 2021 13:05) (70 - 75)  RR: 15 (02 Feb 2021 13:05) (14 - 20)  SpO2: 99% (02 Feb 2021 13:05) (96% - 99%)    GEN: NAD     LLE: Dressing C/D/I.     Motor intact + EHL/FHL/TA/GS. Sensation is grossly intact distal . Extremity warm. Compartments are soft. DP 2+    Labs:                A/P: Patient is a 75y y/o Female s/p left melany TKA, POD # 0    -Pain control/analgesia  -Inc spirometry  -Venodynes/foot pumps  -F/U AM Labs  -PT/OT/WBAT  -Antibiotic periop  -Anticoagulation-  BID  
Ortho Progress Note  MEHUL GIBBONS   MRN-[id number]    75yFemale is s/p elective L melany TKA POD#3 w/out any c/o.  Resting comfortably, NAD, Alert and awake.  Pt states wants to go home and wants saline lock out.    Vital Signs Last 24 Hrs  T(C): 36.7 (05 Feb 2021 05:22), Max: 37.4 (04 Feb 2021 21:12)  T(F): 98 (05 Feb 2021 05:22), Max: 99.4 (04 Feb 2021 21:12)  HR: 91 (05 Feb 2021 05:22) (72 - 91)  BP: 135/81 (05 Feb 2021 05:22) (104/50 - 135/81)  BP(mean): --  RR: 16 (05 Feb 2021 05:22) (16 - 18)  SpO2: 98% (05 Feb 2021 05:22) (96% - 100%)  No Known Allergies      S/P elective L melany TKA  L knee wound aquacel dressing is C/D/I  motor LLE EHL/TA/GS intact against resistance  sensation LLE intact to light touch                            10.4   11.69 )-----------( 272      ( 04 Feb 2021 07:47 )             34.1     02-04    140  |  104  |  21  ----------------------------<  85  4.4   |  27  |  0.79    Ca    9.4      04 Feb 2021 07:47          A/P Ortho Stable s/p elective L melany TKA POD#3  continue Aspirin for DVT ppx  continue Physical Therapy BID: WBAT, OOB for gait training  discharge planning today when pt is cleared by physical therapy for safe home discharge

## 2021-03-08 ENCOUNTER — APPOINTMENT (OUTPATIENT)
Dept: ORTHOPEDIC SURGERY | Facility: CLINIC | Age: 76
End: 2021-03-08

## 2021-06-10 NOTE — H&P PST ADULT - HISTORY OF PRESENT ILLNESS
Patient: Craig Banuelos    Procedure Summary     Date: 06/10/21 Room / Location: [address] [place] [address] MAIN OR 10 / [facility] MAIN OR    Anesthesia Start: 3934 Anesthesia Stop:     Procedure: Excision of right plantar wart with rotational skin plasty, right plantar foot.  (Right 75 year Slovak speaking female with PMH HLD, GERD, OA, patient presents with left severe knee pain radiating to calf muscle , x-rays noted OA, patient is status post right TKR, now scheduled for left total knee replacement with melany robot

## 2021-10-09 NOTE — OCCUPATIONAL THERAPY INITIAL EVALUATION ADULT - ANTICIPATED DISCHARGE DISPOSITION, OT EVAL
[facility]. Please continue to follow progress notes closely, Functional mobility performance may have been limited secondary to symptoms of nausea & vomiting. Abdomen normal. No enlargement of liver. Distractible abdomen.

## 2021-12-01 PROCEDURE — G9005: CPT

## 2022-08-11 NOTE — PATIENT PROFILE ADULT - FUNCTIONAL SCREEN CURRENT LEVEL: SWALLOWING (IF SCORE 2 OR MORE FOR ANY ITEM, CONSULT REHAB SERVICES), MLM)
Your Child's Health  9-10 Year-Old Visit      Mervin Ball  August 11, 2022    There were no vitals taken for this visit.  Weight:       YOUR CHILD'S 9 to 10 YEAR-OLD VISIT    School and Development  How your child performs in school is reflective of all aspects of their development - social skills, language skills, cognitive skills, emotional growth. Some children who have done well in the early grades may start showing learning difficulties later in elementary school as the academics become more challenging. Family stressors, depression, and bullying can affect school performance. It is important to keep in touch with teachers and to investigate if there is a significant change in school performance. If your child receives any services through an IEP, make sure that the plan is updated regularly. Making sure your child has a good night’s sleep and a healthy breakfast each morning continues to be an important part of helping them be successful at school. Children should be becoming more independent in completing homework and school-related tasks. It is still your job to know what their assignments are and provide a distraction-free setting for them to complete their work. While some afterschool activities are good for your child's self-esteem, independence, and peer relationships (and fun!), be careful not to overschedule them. They still need some unstructured downtime and family time on a regular basis.    Protecting Your Child from Violence and Conflict  Bullying, unfortunately, is often common in schools. Make sure your child knows they can talk to you whenever something bad is happening to them at school. Ask them periodically if they feel safe at school. To teach your child nonviolent ways to resolve conflict, be sure that you are being a good role model with your own behaviors when frustrated. When they are dealing with frustrating interactions, talk about how the other person's point of view to  help them develop empathy. Teach them that when they are angry they should practice deep breathing, walk away from the situation, or count to 10 before speaking or reacting in any way. Your child needs to know that outbursts and fighting are not effective ways of solving problems; those behaviors are only going to cause more problems for them in the future. StopBullying.gov and the American Academy of Pediatrics healthychildren.org website (search for “bullying”) have information that can help you deal with bullying in your child’s life.     Gooding and Self-Esteem  Peers will become increasingly important to your young adolescent. Peer influence may lead your child to challenge family rules, reject certain family activities and act more independently. Some of your rules will change as your child grows older, but clear communication about rules and expectations remains very important. As your child spends more time with friends, your time together is very important to help your child feel secure, build their self-esteem, and help them become more independent. Build your child's self-esteem by telling them you are proud of them, point out their strengths, listen respectfully to them and give them lots of hugs. They should have age-appropriate chores and responsibilities with consequences (which you need to be consistent in enforcing!) if they are not completed. Non-school activities (sports, music, clubs, community activities) help them build new skills and self esteem. Keeping family ties strong even in light of increasing peer influence is critical because close family ties are known to be protective against risk-taking behaviors in adolescence. Make time every day to simply talk with their child without any phones, television, or other electronic media distracting you. Get to know your children's friends and make sure they always have adult supervision. Studies have shown that the more unsupervised time  children have, the more likely they are to use drugs. It is important to talk with your child when they have peers making poor decisions. They should know that good friends never ask friends to do things that could be dangerous or get them into trouble. Your child should know that it is always okay for them to call you and ask to come home if they are not comfortable with something that is happening at another child's home.    Protecting Against Tobacco and Substance Use  If there is a smoker in your home, exposure to secondhand smoke (even from e-cigarettes) at home or in a car remains a health risk to your child at this age (at any age!). And as your child is getting older, there is the additional risk of them experimenting with cigarettes, as well as alcohol or any other drugs which might be kept in the home. It is important to frequently tell your child that any substance use is unhealthy and dangerous and that you do not want them to smoke, drink, use drugs or hang out with any friends who are doing those things. Talk to us if you need resources to assist with quitting smoking yourself.    Personal Safety and the Internet  For optimal safety, children this age should still be supervised on the internet. As they get older, they may be permitted to do some limited browsing without your supervision, but you should always be able to access what they are searching. You also need to set a limit on how much time they can spend on electronic media each day. This is also an important time to talk about how information in the media is often unrealistic regarding body image (unrealistic body images are often portrayed), risky behaviors (sending the message that  \"everyone's doing it\"), and violence (often portrayed as without any subsequent suffering or consequences). Tell your child that nothing is truly \"private\" on the internet; they should never text, post, or take pictures of anything that they would not want to be  public knowledge.     Never giving out personal information (full name, address, phone number) is important to internet safety as well as a child's overall personal safety. Remind your child that it is never okay for an older child or adult to ask them to keep a secret. Likewise, remind them that no one should ever talk to them or touch them in a way that makes them uncomfortable.    Puberty  Many children in this age range will start experiencing sexual development. Encourage them to ask questions and answer them in a way that they can understand. If you need guidance talking to your children about sex, check out the American Academy of Pediatrics healthychildren.org website (search “talking about sex”). Talk about the body changes that will start happening and the importance of good hygiene. Girls should know that they will have a small amount of vaginal discharge which is normal for a year or so before they start their periods. Boys should know that nocturnal emissions (\"wet dreams \") are normal. Reassure your child if their peers are developing and they are not; there is a wide range of \"normal\" for starting puberty. Changes may begin for some children at age 8 or 9 years old but it may be another few years before other children start developing.     Dental Health   Your child should be brushing at least twice daily for 2 minutes at a time with toothpaste; they should also be flossing daily and seeing a dentist regularly. Let our office know if you use water from a private well; testing for fluoride content is recommended to determine if fluoride supplements are needed. Tasty things like sweet drinks [juice, soda, sweet tea, Raphael Aid®, etc.], candy, other sweets, and sticky/chewy foods cause tooth decay and should be occasional treats only, not a regular part of a child’s diet (and they should be followed by tooth brushing!). If your child is active in sports, they should wear a mouth guard.    Nutrition,  Exercise and Screen Time  Children can become overweight or obese at any age. Healthy eating choices and regular physical activity are the best ways to keep a child at a healthy weight; dieting and supplements can be harmful at this age. Do your best to keep healthy choices in the home and encourage your child to make healthy eating decisions. Keep things like fresh fruits and vegetables, string cheese, whole grain crackers, yogurt, nuts, and hard-boiled eggs around for snacks. High calorie, high fat and sugary foods should be an occasional treat only. Your child should always have something for breakfast; it is a fact that children who eat breakfast perform better in school. Talk to your children about recognizing when they are full and the importance of avoiding overeating on a regular basis. As often as possible, mealtimes should be a family affair. Keep the TV off, keep phones away from the table, and encourage conversation between everyone in the family.    Good bone health starts at a young age. Your child needs 3 to 4 servings of a good source of calcium daily (low-fat or skim milk, yogurt, cheese, calcium-fortified orange juice or other foods). Vitamin D is needed along with calcium to optimize bone health; 600 IU daily is recommended. Most people cannot meet their vitamin D needs with their usual diet, so a multivitamin with iron supplement is a good way to get it. (A pure vitamin D supplement with 400 or 600 IU is also okay.)    The American Academy of Pediatrics recommends no more than 8 ounces of 100% fruit juice daily. Sports drinks may be appropriate after an hour or so of really vigorous exercise, but as a routine drink, they have lots of sugar and more calories than your child needs. Your child should drink plenty of water with routine activities. The high caffeine levels in energy drinks are potentially dangerous; children should not be allowed to drink these.    At least 60 minutes of physical  activity every day is recommended for all children. Sports teams can be a great choice, but not all children want to play team sports. Options include dancing, swimming, gymnastics, riding a bike, skipping rope, and just plain old running around with other kids. Find activities that will get the whole family moving. When your child is active, make sure they are using any recommended safety equipment (helmets, safety goggles, mouth guards). Avoiding too much sitting (which is usually due to screen time!) is just as important as being active. Check out the American Academy of Pediatrics healthychildren.org website (search “media use”) for recommendations on setting reasonable rules for media (computers, phones, pads, TV) use in your home. Also, keeping phones and electronics out of the bedroom at night is a great step to making sure your child gets a healthy, restful sleep of night.    Car Safety  Children should ride in a booster seat until they are over 80 pounds and 4 feet 9 inches tall (which is how big they need to be before they can be safely secured by a seat belt). High-backed booster seats should be used if there are low seat backs or no head rests in your car; backless boosters can be used if your car has high seat backs and head rests. They are safest in the back seat until they are 13 years old.    Water Safety  Your child should be taught how to swim if they have not already had lessons. However, even if they are a good swimmer, they should never swim on their own without adult supervision. They should be taught not to dive into water until an adult has checked to make sure the water is deep enough to be safe for diving. Your child should always wear a [employer] approved lifejacket when on a boat or any watercraft. You should make sure that swimming pools that your child has access to (in your yard, apartment complex or neighborhood) are fenced with a locked, self-closing, self-latching gate.    Your  child should wear sunscreen with an SPF of 15 or greater whenever they are outside. It should be reapplied every two hours and after being in the water. Encourage your child to wear other sun protective gear (hat, sunglasses, sun protection clothing) and avoid time in the sun between 11 and 3 PM when possible.    Guns and Firearms  If you keep a firearm in your home, be sure to store it unloaded and locked up with ammunition locked separately. Find out if there are firearms in any of the homes your child might visit and make sure those are safely stored before allowing your child to visit. Your child should know that if they ever see a gun, they should not touch it, they should leave the area, and they should tell an adult.    MEDICATION FOR FEVER OR PAIN:   Acetaminophen liquid (e.g., Tylenol or Tempra) may be given every four hours as needed for pain or fever.  Acetaminophen liquid is less concentrated than the infant dropper bottle type. Be sure to check which product CONCENTRATION you are using.      CHILDREN’S Tylenol/Acetaminophen  (160 MG/5 mL)    Child’s Weight:  Dose:  36 - 47 pounds:    240 mg (7.5 mL (1 1/2 Teaspoons))  48 - 59 pounds:    320 mg (10.0 mL (2 Teaspoons))  60 - 71 pounds:    400 mg (12.5 mL (2 1/2 Teaspoons))  72 - 95 pounds:    480 mg (15.0 mL (3 Teaspoons))    CHILDREN’S Tylenol/Acetaminophen MELTAWAYS ( 80 MG tablets)    Child’s Weight:  Dose:  36 - 47 pounds:    240 mg (3 meltaway tablets)  48 - 59 pounds:    320 mg (4 meltaway tablets)  60 - 71 pounds:    400 mg (5 meltaway tablets)  72 - 95 pounds:    480 mg (6 meltaway tablets)    Neo (Jr) Tylenol/Acetaminophen MELTAWAYS (160 MG tablets)    Child’s Weight:  Dose:  36 - 47 pounds:    240 mg (1 1/2 meltaway tablets)  48 - 59 pounds:    320 mg (2 meltaway tablets)  60 - 71 pounds:    400 mg (2 1/2 meltaway tablets)  72 - 95 pounds:    480 mg (3 meltaway tablets)    CHILDREN'S Ibuprofen (e.g., Advil or Motrin) may be given every six  hours as needed for pain or fever.    Child’s Weight:  Dose:  48 - 59 pounds:    200 mg (2 Teaspoons of liquid)  60 - 71 pounds:    250 mg (2 1/2Teaspoons of liquid)  72 - 95 pounds:    300 mg (3 Teaspoons of liquid)    NEXT VISIT:  IN 1 Year      Thank you for entrusting your care to [facility].    Also, check out “Children’s Health” on the [facility] Blog for updates on timely topics regarding children’s health!                   0 = swallows foods/liquids without difficulty

## 2022-11-16 ENCOUNTER — EMERGENCY (EMERGENCY)
Facility: HOSPITAL | Age: 77
LOS: 1 days | Discharge: HOME | End: 2022-11-16
Attending: STUDENT IN AN ORGANIZED HEALTH CARE EDUCATION/TRAINING PROGRAM
Payer: MEDICARE

## 2022-11-16 VITALS
RESPIRATION RATE: 20 BRPM | WEIGHT: 209.44 LBS | OXYGEN SATURATION: 95 % | HEIGHT: 60 IN | HEART RATE: 102 BPM | DIASTOLIC BLOOD PRESSURE: 67 MMHG | TEMPERATURE: 100 F | SYSTOLIC BLOOD PRESSURE: 135 MMHG

## 2022-11-16 DIAGNOSIS — Z98.890 OTHER SPECIFIED POSTPROCEDURAL STATES: Chronic | ICD-10-CM

## 2022-11-16 DIAGNOSIS — Z96.651 PRESENCE OF RIGHT ARTIFICIAL KNEE JOINT: Chronic | ICD-10-CM

## 2022-11-16 LAB
ALBUMIN SERPL ELPH-MCNC: 4.5 G/DL — SIGNIFICANT CHANGE UP (ref 3.3–5)
ALP SERPL-CCNC: 84 U/L — SIGNIFICANT CHANGE UP (ref 40–120)
ALT FLD-CCNC: 14 U/L — SIGNIFICANT CHANGE UP (ref 10–45)
ANION GAP SERPL CALC-SCNC: 12 MMOL/L — SIGNIFICANT CHANGE UP (ref 5–17)
AST SERPL-CCNC: 16 U/L — SIGNIFICANT CHANGE UP (ref 10–40)
BASE EXCESS BLDV CALC-SCNC: 3.2 MMOL/L — HIGH (ref -2–3)
BASOPHILS # BLD AUTO: 0.04 K/UL — SIGNIFICANT CHANGE UP (ref 0–0.2)
BASOPHILS NFR BLD AUTO: 0.3 % — SIGNIFICANT CHANGE UP (ref 0–2)
BILIRUB SERPL-MCNC: 0.8 MG/DL — SIGNIFICANT CHANGE UP (ref 0.2–1.2)
BUN SERPL-MCNC: 12 MG/DL — SIGNIFICANT CHANGE UP (ref 7–23)
CA-I SERPL-SCNC: 1.24 MMOL/L — SIGNIFICANT CHANGE UP (ref 1.15–1.33)
CALCIUM SERPL-MCNC: 9.7 MG/DL — SIGNIFICANT CHANGE UP (ref 8.4–10.5)
CHLORIDE BLDV-SCNC: 102 MMOL/L — SIGNIFICANT CHANGE UP (ref 96–108)
CHLORIDE SERPL-SCNC: 101 MMOL/L — SIGNIFICANT CHANGE UP (ref 96–108)
CO2 BLDV-SCNC: 32 MMOL/L — HIGH (ref 22–26)
CO2 SERPL-SCNC: 25 MMOL/L — SIGNIFICANT CHANGE UP (ref 22–31)
CREAT SERPL-MCNC: 0.78 MG/DL — SIGNIFICANT CHANGE UP (ref 0.5–1.3)
EGFR: 79 ML/MIN/1.73M2 — SIGNIFICANT CHANGE UP
EOSINOPHIL # BLD AUTO: 0.01 K/UL — SIGNIFICANT CHANGE UP (ref 0–0.5)
EOSINOPHIL NFR BLD AUTO: 0.1 % — SIGNIFICANT CHANGE UP (ref 0–6)
FLUAV AG NPH QL: SIGNIFICANT CHANGE UP
FLUBV AG NPH QL: SIGNIFICANT CHANGE UP
GAS PNL BLDV: 136 MMOL/L — SIGNIFICANT CHANGE UP (ref 136–145)
GAS PNL BLDV: SIGNIFICANT CHANGE UP
GAS PNL BLDV: SIGNIFICANT CHANGE UP
GLUCOSE BLDV-MCNC: 146 MG/DL — HIGH (ref 70–99)
GLUCOSE SERPL-MCNC: 141 MG/DL — HIGH (ref 70–99)
HCO3 BLDV-SCNC: 30 MMOL/L — HIGH (ref 22–29)
HCT VFR BLD CALC: 36.8 % — SIGNIFICANT CHANGE UP (ref 34.5–45)
HCT VFR BLDA CALC: 34 % — LOW (ref 34.5–46.5)
HGB BLD CALC-MCNC: 11.3 G/DL — LOW (ref 11.7–16.1)
HGB BLD-MCNC: 10.9 G/DL — LOW (ref 11.5–15.5)
IMM GRANULOCYTES NFR BLD AUTO: 0.6 % — SIGNIFICANT CHANGE UP (ref 0–0.9)
LACTATE BLDV-MCNC: 1.7 MMOL/L — SIGNIFICANT CHANGE UP (ref 0.5–2)
LIDOCAIN IGE QN: 19 U/L — SIGNIFICANT CHANGE UP (ref 7–60)
LYMPHOCYTES # BLD AUTO: 1.17 K/UL — SIGNIFICANT CHANGE UP (ref 1–3.3)
LYMPHOCYTES # BLD AUTO: 9.4 % — LOW (ref 13–44)
MCHC RBC-ENTMCNC: 24.8 PG — LOW (ref 27–34)
MCHC RBC-ENTMCNC: 29.6 GM/DL — LOW (ref 32–36)
MCV RBC AUTO: 83.6 FL — SIGNIFICANT CHANGE UP (ref 80–100)
MONOCYTES # BLD AUTO: 0.83 K/UL — SIGNIFICANT CHANGE UP (ref 0–0.9)
MONOCYTES NFR BLD AUTO: 6.7 % — SIGNIFICANT CHANGE UP (ref 2–14)
NEUTROPHILS # BLD AUTO: 10.33 K/UL — HIGH (ref 1.8–7.4)
NEUTROPHILS NFR BLD AUTO: 82.9 % — HIGH (ref 43–77)
NRBC # BLD: 0 /100 WBCS — SIGNIFICANT CHANGE UP (ref 0–0)
PCO2 BLDV: 54 MMHG — HIGH (ref 39–42)
PH BLDV: 7.35 — SIGNIFICANT CHANGE UP (ref 7.32–7.43)
PLATELET # BLD AUTO: 375 K/UL — SIGNIFICANT CHANGE UP (ref 150–400)
PO2 BLDV: 29 MMHG — SIGNIFICANT CHANGE UP (ref 25–45)
POTASSIUM BLDV-SCNC: 4.4 MMOL/L — SIGNIFICANT CHANGE UP (ref 3.5–5.1)
POTASSIUM SERPL-MCNC: 4.4 MMOL/L — SIGNIFICANT CHANGE UP (ref 3.5–5.3)
POTASSIUM SERPL-SCNC: 4.4 MMOL/L — SIGNIFICANT CHANGE UP (ref 3.5–5.3)
PROT SERPL-MCNC: 8.3 G/DL — SIGNIFICANT CHANGE UP (ref 6–8.3)
RBC # BLD: 4.4 M/UL — SIGNIFICANT CHANGE UP (ref 3.8–5.2)
RBC # FLD: 16.1 % — HIGH (ref 10.3–14.5)
RSV RNA NPH QL NAA+NON-PROBE: SIGNIFICANT CHANGE UP
SAO2 % BLDV: 45.6 % — LOW (ref 67–88)
SARS-COV-2 RNA SPEC QL NAA+PROBE: DETECTED
SODIUM SERPL-SCNC: 138 MMOL/L — SIGNIFICANT CHANGE UP (ref 135–145)
WBC # BLD: 12.45 K/UL — HIGH (ref 3.8–10.5)
WBC # FLD AUTO: 12.45 K/UL — HIGH (ref 3.8–10.5)

## 2022-11-16 PROCEDURE — 99285 EMERGENCY DEPT VISIT HI MDM: CPT

## 2022-11-16 PROCEDURE — 74177 CT ABD & PELVIS W/CONTRAST: CPT | Mod: 26,MA

## 2022-11-16 NOTE — ED PROVIDER NOTE - CARE PROVIDER_API CALL
Denys Junior  [facility]  [address] [facility] [facility] [address] [place] [place] [address]  Phone: [phone]  Fax: [phone]  Follow Up Time:

## 2022-11-16 NOTE — ED PROVIDER NOTE - OBJECTIVE STATEMENT
76yoF PMH HLD, adrenal mass (benign, asymptomatic, under c/o endocrinologist), b/l knee replacements, gallstones, diverticulitis p/w 4d of abdominal pain that has been worsening. Pt c/o pain in the epigastric area and back pain. She felt dysuria earlier this week and tried Azo as well as nitrofurantoin given to her by one of her sisters w/o relief. Today, pt spiked fever at home to 102. She denies CP, N/V/D. She endorses SOB particularly on exertion, but also has difficulty breathing 2/2 abdominal pain.

## 2022-11-16 NOTE — ED PROVIDER NOTE - RAPID ASSESSMENT
PMD: Denys Junior ([phone])  Pt is predominantly Uzbek speaking and declines translation services. H&P obtained via sister translating in triage:  77 y/o F w/ PMHx L sided gallstone, presents to the ED c/o dysuria x4days w/ fever (TMAX 102.5) and vomiting. Pt reports back pain after eating. Denies any other acute complaints.    IAshley (Mercedezibricardo) have documented this rapid assessment note under the dictation of Efraín Mcmahon)  which has been reviewed and affirmed to be accurate. Patient was seen as a QDOC patient. The patient will be seen and further worked up in the main emergency department and their care will be completed by the main emergency department team along with a thorough physical exam. Receiving team will follow up on labs, analgesia, any clinical imaging, reassess and disposition as clinically indicated, all decisions regarding the progression of care will be made at their discretion. PMD: Junior, Denys ([phone])  Pt is predominantly Korean speaking and declines translation services. H&P obtained via sister translating in triage:  77 y/o F w/ PMHx L sided gallstone, presents to the ED c/o dysuria x4days w/ fever (TMAX 102.5) and vomiting. Pt reports back pain after eating. Denies any other acute complaints. PE eldelry female awake alert looking milldy uncomfortable from left flank pn  Efraín Mcmahon MD, Roxane     IAshley (Scribe) have documented this rapid assessment note under the dictation of Efraín Mcmahon (MD)  which has been reviewed and affirmed to be accurate. Patient was seen as a QDOC patient. The patient will be seen and further worked up in the main emergency department and their care will be completed by the main emergency department team along with a thorough physical exam. Receiving team will follow up on labs, analgesia, any clinical imaging, reassess and disposition as clinically indicated, all decisions regarding the progression of care will be made at their discretion.    Pt seen in [facility] as Q-doc with scribe. Full H&P to be performed once pt is transferred to main ED  Efraín Mcmahon MD, Facep

## 2022-11-16 NOTE — ED PROVIDER NOTE - CLINICAL SUMMARY MEDICAL DECISION MAKING FREE TEXT BOX
76yoF p/w several days of abdominal pain, dysuria, fever today at home to 102. Tried nitrofurantoin at home for 1 day. Temp 99.7 on arrival to ED, , tachypneic to 20. WBC elevated, lipase and LFTs wnl. Pending CTAP w/ IV contrast, UA and ucx, blood cx. Of note, pt is COVID+ but had covid a few weeks ago.

## 2022-11-16 NOTE — ED PROVIDER NOTE - NSFOLLOWUPINSTRUCTIONS_ED_ALL_ED_FT
YOU WERE SEEN IN THE ED FOR: UTI, abdominal pain, fever     YOU WERE PRESCRIBED: cefpodoxime   FOLLOW THE INSTRUCTIONS ON THE LABEL/CONTAINER    FOR PAIN/FEVER, YOU MAY TAKE TYLENOL (acetaminophen) AND/OR IBUPROFEN (advil or motrin). FOLLOW THE INSTRUCTIONS ON THE LABEL/CONTAINER.    PLEASE FOLLOW UP WITH YOUR PRIVATE PHYSICIAN WITHIN THE NEXT 48 HOURS. BRING COPIES OF YOUR RESULTS.    Please finish your course of antibiotics. If you have any of the following symptoms, you must return to the ED:   - high fever   - worsening abdominal pain +/- nausea or vomiting   - confusion, lethargy, or altered mental status   - shortness of breath, chest pain   - any other symptom that is abnormal or concerning for you

## 2022-11-16 NOTE — ED ADULT NURSE NOTE - CHIEF COMPLAINT QUOTE
Fevers at home, recently dx with UTI sunday on PO abx, tmax at home 102.4, took tylenol prior to arrival reporting back pain

## 2022-11-16 NOTE — ED PROVIDER NOTE - ATTENDING CONTRIBUTION TO CARE
I, Aaron Stewart, performed a history and physical exam of the patient and discussed their management with the resident and /or advanced care provider. I reviewed the resident and /or ACP's note and agree with the documented findings and plan of care. I was present and available for all procedures.    76-year-old female past medical history of HLD, benign adrenal mass, gallstones and diverticulitis presents with abdominal pain associated with dysuria and no hematuria.  Patient felt that she was having a UTI and started over-the-counter Azo without significant improvement and then started her sisters Macrobid x1 day and states that she had a fever with a T-max of 102 degrees prompting her visit to the ED.  On exam patient with left-sided CVA tenderness, otherwise looks well and nontoxic with normal vitals.  Patient ordered for infectious work-up including a CT of the abdomen and pelvis.  At this time primary diagnosis concerning for pyelonephritis, possible kidney stone, low suspicion for aortic pathology or atypical chest pain.  If patient's UA is positive patient will need sure decision-making regarding possible discharge as a walking pyelonephritis though she is 76 years old she is well-appearing or possible observation with IV antibiotics and reassessment.

## 2022-11-16 NOTE — ED ADULT NURSE NOTE - OBJECTIVE STATEMENT
77yo F pmh HLD, GERD, obesity, diverticulitis, presents to ED from home with sister at bedside with c/o fever and abdominal pain. Pt reports having a UTI since Sunday with burning on urination that is now improving with antibiotic use. Pt is also endorsing 7/10 waxing and waning pain to the epigastrium and b/l ribs that radiates to the b/l flank, has been ongoing for months. Reports she came to ED for eval d/t 1 episode of "foamy" vomit today and new onset of chills and fever to 102.5 today at 430pm, took Tylenol at onset. Also reports testing positive on 10/26/22 for COVID with fever and related symptoms since resolved. Denies any hematuria, dysuria, cp, sob, diarrhea, or nausea at this time. On assessment, pt is awake, a&ox4, STOUT, afebrile, vitals stable, spontaneous unlabored respirations, abd soft nt nd, no CVA tenderness noted. NAD noted at this time. IV placed, labs drawn and sent. MD Rodriguez at bedside for eval.

## 2022-11-16 NOTE — ED PROVIDER NOTE - PATIENT PORTAL LINK FT
You can access the FollowMyHealth Patient Portal offered by [facility] by registering at the following website: [email or web address]. By joining [facility]’s FollowMyHealth portal, you will also be able to view your health information using other applications (apps) compatible with our system.

## 2022-11-16 NOTE — ED ADULT TRIAGE NOTE - CHIEF COMPLAINT QUOTE
Fevers at home, recently dx with UTI sunday on PO abx, tmax at home 102.4, reporting back pain Fevers at home, recently dx with UTI sunday on PO abx, tmax at home 102.4, took tylenol prior to arrival reporting back pain

## 2022-11-16 NOTE — ED PROVIDER NOTE - PROGRESS NOTE DETAILS
UA+ for leuk esterase, WBC, neg nitrites and bacteria. CTAP showing likely ascending UTI. Pt is HDS. Giving 1g ctx, will dc with PO cefpodoxime and strict return precautions

## 2022-11-17 VITALS
TEMPERATURE: 99 F | RESPIRATION RATE: 18 BRPM | OXYGEN SATURATION: 97 % | DIASTOLIC BLOOD PRESSURE: 84 MMHG | SYSTOLIC BLOOD PRESSURE: 129 MMHG | HEART RATE: 94 BPM

## 2022-11-17 PROBLEM — I73.9 PERIPHERAL VASCULAR DISEASE, UNSPECIFIED: Chronic | Status: ACTIVE | Noted: 2020-01-28

## 2022-11-17 PROBLEM — E66.9 OBESITY, UNSPECIFIED: Chronic | Status: ACTIVE | Noted: 2021-01-19

## 2022-11-17 PROBLEM — G56.00 CARPAL TUNNEL SYNDROME, UNSPECIFIED UPPER LIMB: Chronic | Status: ACTIVE | Noted: 2020-01-28

## 2022-11-17 LAB
APPEARANCE UR: CLEAR — SIGNIFICANT CHANGE UP
BACTERIA # UR AUTO: NEGATIVE — SIGNIFICANT CHANGE UP
BILIRUB UR-MCNC: NEGATIVE — SIGNIFICANT CHANGE UP
COLOR SPEC: ABNORMAL
DIFF PNL FLD: ABNORMAL
EPI CELLS # UR: 1 /HPF — SIGNIFICANT CHANGE UP
GLUCOSE UR QL: NEGATIVE — SIGNIFICANT CHANGE UP
HYALINE CASTS # UR AUTO: 1 /LPF — SIGNIFICANT CHANGE UP (ref 0–2)
KETONES UR-MCNC: NEGATIVE — SIGNIFICANT CHANGE UP
LEUKOCYTE ESTERASE UR-ACNC: ABNORMAL
NITRITE UR-MCNC: NEGATIVE — SIGNIFICANT CHANGE UP
PH UR: 6.5 — SIGNIFICANT CHANGE UP (ref 5–8)
PROT UR-MCNC: ABNORMAL
RBC CASTS # UR COMP ASSIST: 6 /HPF — HIGH (ref 0–4)
SP GR SPEC: 1.04 — HIGH (ref 1.01–1.02)
UROBILINOGEN FLD QL: NEGATIVE — SIGNIFICANT CHANGE UP
WBC UR QL: 83 /HPF — HIGH (ref 0–5)

## 2022-11-17 PROCEDURE — 96374 THER/PROPH/DIAG INJ IV PUSH: CPT | Mod: XU

## 2022-11-17 PROCEDURE — 85025 COMPLETE CBC W/AUTO DIFF WBC: CPT

## 2022-11-17 PROCEDURE — 82803 BLOOD GASES ANY COMBINATION: CPT

## 2022-11-17 PROCEDURE — 87150 DNA/RNA AMPLIFIED PROBE: CPT

## 2022-11-17 PROCEDURE — 80053 COMPREHEN METABOLIC PANEL: CPT

## 2022-11-17 PROCEDURE — 99285 EMERGENCY DEPT VISIT HI MDM: CPT | Mod: 25

## 2022-11-17 PROCEDURE — 87086 URINE CULTURE/COLONY COUNT: CPT

## 2022-11-17 PROCEDURE — 93005 ELECTROCARDIOGRAM TRACING: CPT

## 2022-11-17 PROCEDURE — 87040 BLOOD CULTURE FOR BACTERIA: CPT

## 2022-11-17 PROCEDURE — 85014 HEMATOCRIT: CPT

## 2022-11-17 PROCEDURE — 83605 ASSAY OF LACTIC ACID: CPT

## 2022-11-17 PROCEDURE — 74177 CT ABD & PELVIS W/CONTRAST: CPT | Mod: MA

## 2022-11-17 PROCEDURE — 82435 ASSAY OF BLOOD CHLORIDE: CPT

## 2022-11-17 PROCEDURE — 87077 CULTURE AEROBIC IDENTIFY: CPT

## 2022-11-17 PROCEDURE — 81001 URINALYSIS AUTO W/SCOPE: CPT

## 2022-11-17 PROCEDURE — 87637 SARSCOV2&INF A&B&RSV AMP PRB: CPT

## 2022-11-17 PROCEDURE — 82947 ASSAY GLUCOSE BLOOD QUANT: CPT

## 2022-11-17 PROCEDURE — 84295 ASSAY OF SERUM SODIUM: CPT

## 2022-11-17 PROCEDURE — 82330 ASSAY OF CALCIUM: CPT

## 2022-11-17 PROCEDURE — 85018 HEMOGLOBIN: CPT

## 2022-11-17 PROCEDURE — 84132 ASSAY OF SERUM POTASSIUM: CPT

## 2022-11-17 PROCEDURE — 83690 ASSAY OF LIPASE: CPT

## 2022-11-17 RX ORDER — CEFTRIAXONE 500 MG/1
1000 INJECTION, POWDER, FOR SOLUTION INTRAMUSCULAR; INTRAVENOUS ONCE
Refills: 0 | Status: COMPLETED | OUTPATIENT
Start: 2022-11-17 | End: 2022-11-17

## 2022-11-17 RX ORDER — CEFPODOXIME PROXETIL 100 MG
1 TABLET ORAL
Qty: 14 | Refills: 0
Start: 2022-11-17 | End: 2022-11-23

## 2022-11-17 RX ADMIN — CEFTRIAXONE 100 MILLIGRAM(S): 500 INJECTION, POWDER, FOR SOLUTION INTRAMUSCULAR; INTRAVENOUS at 02:20

## 2022-11-18 LAB
CULTURE RESULTS: SIGNIFICANT CHANGE UP
SPECIMEN SOURCE: SIGNIFICANT CHANGE UP

## 2022-11-19 LAB
-  STAPHYLOCOCCUS EPIDERMIDIS: SIGNIFICANT CHANGE UP
GRAM STN FLD: SIGNIFICANT CHANGE UP
METHOD TYPE: SIGNIFICANT CHANGE UP

## 2022-11-19 NOTE — ED POST DISCHARGE NOTE - ADDITIONAL DOCUMENTATION
11/22: Received additional voicemail from patient. Called back and spoke to patient sister. She states pt did not want to return to ED so instead followed up with PCP. Blood cx not repeated in office bu pt was evaluated. Advised without repeat negative cultures cannot say 100 percent that results do not represent infection, but given result of Staph Epidermis and pt being asymptomatic likely contaminate. Pt sister appreciative of call and endorsed understanding of what was relayed to her - Cici Mcclure PA-C

## 2022-11-19 NOTE — ED POST DISCHARGE NOTE - DETAILS
left message for a callback. pt here with urinary symx with fever. treated with cefpodoxime. Will attempt to call the patient again later today. -DELON Reardon Spoke with patient and her sister who provided translation.  Patient feeling well, afebrile.  Explained results of blood culture.  PCR staph epi.  Explained that this is likely a contaminate, but she still needs to return to the ER for re-evaluation.  Patient expressed understanding.  -Go Ortega PA-C

## 2022-11-20 LAB
CULTURE RESULTS: SIGNIFICANT CHANGE UP
ORGANISM # SPEC MICROSCOPIC CNT: SIGNIFICANT CHANGE UP
ORGANISM # SPEC MICROSCOPIC CNT: SIGNIFICANT CHANGE UP
SPECIMEN SOURCE: SIGNIFICANT CHANGE UP

## 2022-11-22 LAB
CULTURE RESULTS: SIGNIFICANT CHANGE UP
SPECIMEN SOURCE: SIGNIFICANT CHANGE UP

## 2023-01-01 NOTE — OCCUPATIONAL THERAPY INITIAL EVALUATION ADULT - IMPAIRED TRANSFERS: TOILET, REHAB EVAL
[facility]  PROGRESS NOTE       Today's Date: 2023     Patient Name: Juanita Rodriguez   MRN: [id number]   YOB: 2022   Room/Bed: 11/11 A     GA at Birth: Gestational Age: 33w2d   DOL: 14 days   CGA: 35w 2d   Birth Weight: 1740 g (3 lb 13.4 oz)   Current Weight:  Weight: 1870 g (4 lb 2 oz)   Weight change: -5 g (-0.2 oz)     PE and plan of care reviewed with attending physician.    Vital Signs (Most Recent):  Temp: 98 °F (36.7 °C) (23)  Pulse: 134 (23)  Resp: 49 (23)  BP: (!) 69/42 (23)  SpO2: (!) 100 % (23) Vital Signs (24h Range):  Temp:  [97.7 °F (36.5 °C)-98.7 °F (37.1 °C)] 98 °F (36.7 °C)  Pulse:  [126-167] 134  Resp:  [31-58] 49  SpO2:  [97 %-100 %] 100 %  BP: (69)/(42) 69/42     Assessment and Plan:  /AGA: 33  2/7 weeks , length 4th percentile   Plan:  Provide appropriate developmental care.      Cardioresp:  RRR, no audible murmur, precordium quiet, pulses 2+ and equal, capillary refill 2 seconds, BP stable.  BBS clear and equal, good air entry. Comfortable work of breathing.  Remains stable in RA.   Plan:  Follow clinically.     FEN:  Abdomen soft, nondistended, active bowel sounds, no masses, no HSM. Tolerating feeds of SSC 24 taylor 35 ml q 3 hrs. PO per IDF protocol, and completed .   ml/kg/day. UOP 3.4 ml/kg/hr and stool x3.   Plan:  Maintain current feeding; monitor intake and output. Continue IDF per protocol.  ml/kg/d. Initiate PVS with Fe supplementation. CMP in AM.     Heme/ID/Bili:       CBC wbc 8.8 (S 54, B 1) Hct 44.6, plt 334k. Retic 7.8%.    Bili 3., total bili decreased, increased direct Bili.   Plan: Follow clinically. Follow direct bili in AM.      Neuro/HEENT: AFSF, sutures slighlty overriding. Appropriate tone and activity for gestation.   Plan:  Follow clinically.      Social: Mother without prenatal care, presented in progressive labor and PROM. Maternal urine drug  screen positive for Amphetamines and THC. Infant UDS + amphetamines ad cannabinoids.  MDS positive for amphetamines, methamphetamines, and THC. In state custody, see  notes.   Plan: Follow with .        Discharge planning:  OB: Red   Pedi: unknown         NBS Normal with results pending for Pompe Disease and MPS I.        Plan:     Follow pending NBS results.  Car seat study, ABR, CCHD screening and CPR instruction prior to discharge. Hepatitis B immunization at 30 DOL or prior to discharge.  Repeat ABR outpatient at 9 months of age.     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    Premature infant of 33 weeks gestation 2023    At risk for alteration of nutrition in  2023    ROM (rupture of membranes), premature 2023    Positive direct Neil test 2023        Medications:   Scheduled   pediatric multivitamin with iron  1 mL Oral Q24H             PRN  stomahesive and zinc oxide 20%, Nursing communication **AND** Nursing communication **AND** Nursing communication **AND** [CANCELED] Nursing communication **AND** [COMPLETED] Bilirubin, Direct **AND** white petrolatum, zinc oxide-cod liver oil     Labs:    No results found for this or any previous visit (from the past 12 hour(s)).         Microbiology:   Microbiology Results (last 7 days)       ** No results found for the last 168 hours. **                                       Clear decreased strength/impaired balance/decreased ROM

## 2023-01-26 NOTE — PRE-OP CHECKLIST - AS TEMP SITE
Heparin gtt started at 18 units/kg/hr. One time bolus given prior to starting - see eMAR. Pt more alert this afternoon, holding conversations/ speaking clear sentences. Levo stopped at 1216 and jeannine stopped at 1526. HR sustaining 120s and troponin remains elevated at 0.20 - ICU residents aware. 500ml bolus to be ordered. Room air. Joseph with gary/ hazy urine. Pt placed on specialty bed.  and nephew remain at bedside. oral

## 2023-05-01 NOTE — PHYSICAL THERAPY INITIAL EVALUATION ADULT - MANUAL MUSCLE TESTING RESULTS, REHAB EVAL
except Left LE,  (-) SLR limited due to pain/no strength deficits were identified Detail Level: Zone

## 2023-05-03 ENCOUNTER — APPOINTMENT (OUTPATIENT)
Dept: SURGERY | Facility: CLINIC | Age: 78
End: 2023-05-03
Payer: MEDICARE

## 2023-05-03 PROCEDURE — 99203K: CUSTOM

## 2023-10-02 NOTE — ED ADULT NURSE NOTE - NSSUHOSCREENINGYN_ED_ALL_ED
HEME ONCOLOGY PROGRESS NOTES    DATE: 10/2/2023  PATIENT: Victorino Bullock  Referred By: Isaiah David DO      .      Diagnosis: prostate  ca  Stage: 4      CHIEF COMPLAINT:  Victorino Bullock is here in follow up for the management of   Prostate ca with lung  Mets      Oncologic  History  Oncologic  History.  Initial  Dx  Of  Prostate ca  In 2008   Sep 08 underwent robotic assisted lap radical prostatectomy at [facility] [place] path lizzette 4+3=7  No LVI but yovana neural invasion present T3a n0m0  Followed  By  Dr iyer   psa 0,41 oct 2014  psa 0.5 2016  psa 1.09 2017 June  psa 2.08 feb 2018   psa 2.22 June 2018  Increased  psa of 2.28 in June 2018  Bone  Scan neg  Ct abd and pelvis neg   Seen  At  [facility]  prostascan pos in the bed   Aug 2018 pet    Scan  showed   Increased uptake in 2 areas in the  Rt  Lung  Upper  And  Lower  Lobes  psa 2.54 oct 2018   Ct chest  At [facility] 9/2018  Showed rul 1.2x1.2 cms  Nodule  And  One in the  rrll 1.2x0,7 cms   psa 3.72  Jan 2019   Ct  Guided  bx  Jan 2019  rul  Pos  For  Metastatic  adenoca from  prostate primary  saw  Dr iyer  Urologist  Advised  CAB  With  lupron and  Casodex  Saw  Dr Whitfield  Who  D/w  Them  About  provenge  Therapy   And  D/c  Ed  Casodex  Seen  By  [facility]  Rad onc  On  1/25/19  Did  Not recommend  RT  And  Agreed  With  Systemic  Therapy  4/2019 s/p  cyberknife  RT  To  The  Lung  met  6/26/19 psa < 0.01  10/2019 psa <0.01  4/20 psa 0.1  Cbc  cmp  Nl  testosterone 5  10/2020  Started  On  xtandi  Discontinued xtandi after 1 yr as he was stable and he was reluctant to continue and he was stable     HISTORY OF PRESENT ILLNESS:  This is a 75 year old male comes  For  F/u  Of  Prostate  Ca  With  Lung  Met  S/p  cyberknife  RT  And is  On  lupron . Doing  Better  Still feels  Anxious  Last  psa  Is  Nl   11/6/19  Pt  Comes  For  F/u  His  Last  psa  Is  Nl at  Urologist  Office less  Than 0.01  4/6/20  Called  Pt  And  Agreed  For   Telephone  Visit  He  Sees     Nahid   For  F/u  Of  Prostate  Ca  Also  And  He  Was  Told  He  Will  Stop  His  lupron  As  He  Remains  Stable  And  Pt  Is  Extremely  Worried  About  That  To  Note  Has  Stage  4  Ca  With  Lung  Met  S/p  cyberknife RT  He  Denies  Any  Pain or  Sob  Has  Some  Chest  Discomfort  And  Has  Hot  Flashes  From lupron     5/5/20 called  Pt  For  Phone  Visit   Sec  To  covid 19  And  Agreed c/o  Feeling  Very  Anxious  And  Tired  All  The  Time  Denies  Any  Pain  Says  Got  His  xtandi  And  Did  Not  Start  Now  He  Wants  To  Wait  Till   He  Gets  The  Next  Ct  Scan  Reviewed  Dr pearce  Last  Note  From  April  And  He   Was  Going  To  Stop  Hormone  Therapy  As  He  Is  Under  Very   Good  Control.which is  Reasonable  But  Pt  Called  Me  And  Was  Concerned  About  Stopping  His  lupron  Considering  His  Severe  Anxiety   And  Needing  The  Best  Available  Treatments  I  Have  D/w  Him   Extensively   If  He   Is  woorying  And  Does  Not  Want  To  Stop  We could  Continue  lupron  And  Also  xtandi   And  He  Already  Got  The  Med   Delivered  To  Him  But  Today  He  Wants to  Wait  Until  He  Gets  The  Next  F/u  Ct  Scan  In   May  Or  June  I  Have  D/w  Him  And  His  Wife   That   He  Is  Doing  Well  And   Can  Weight   And  Watch  Also.  He  Admits  To  Feeling  Tired  And  Weak  And  Anxious   6/10/20  Called  Pt  For   Video  Visit  And agreed  Sec  To  covid  19  Had  Ct scan   And  Here  For  Results  Has been  Very  Anxious    And  C/o   Hot  flashes  8/17/20  Called  The  Pt  For  Video  Visit  And  Agreed  Sec  To  covid  19   Says  He  Did  Not  Start his  xtandi  And  Is continuing  On  His lupron shot  q  6 months  From  Dr Whitfield  Feels  Depressed  From  Corona virus  Situation  Denies any  Pain   11/6/20  Comes  For  F/u  Says he is  Taking xtandi  And  Has  Been  Tolerating  Ok  Has  Some  Fatigue  And  Also is taking  Anti depressant   ic  Also  On lupron  From [facility]  office  q 6 months  2/5/21  Comes  For  F/u  Is  On  lupron   And  xtandi  And  Tolerating  Well  . Is  Concerned  Bout   Wt gain   5/11/21 comes for f/u  C/o  Rt  Shoulder  Pain  And some neck pain   8/11/21    Pt  Comes  For  F/u  . Has  Issues  With  Not  Feeling  Like  A man  And  Has   Hot  Flashes  From lupron.  Tolerating  xtandi ok. He saw Dr bui  For  F/u  And his psa  Is  Under  Excellent  Control and   He  Discussed  With  The  Pt  About  Stopping  ADT and xtandi. He  Wants  My  Opinion   informed him  We  Usually  Look at s/e  And if pt  Has  Issues  Sp in his  Case  As he had oligometastatic  Disease  And is under  Excellent  Control one  Could  Stop xtandi   And  Monitor  Closely. Pt  Is  Not  Sure  What  To  Do . I have informed  Him it is ok  If  He stops  And  Then  He  Feels  Very  Anxious  And  Worries  About  Ca   May come  Back   11/15/21  Comes for f/u of stage 4 prostate ca with solitary lung met treated with SBRT and  Has een feeling good he has stopped his xtandi few months ago and gets lupron q 6 months and follows with dr Bui.  He tells me  Nahid d/w him to switch to Rulogolix from jan  and wants my input.  He denies any pain  And just feels very anxious about his dx    2/9/22  Comes for f/u of  Prostate ca stage 4 with solitary lung met s/p SBRT 2019  on lupron and had af/u ct chest which is stable  Saw Dr bui and d/w him rulogolix and was not approved by insurance   He feels anxious     8/18/22  Comes for f/u of  Prostate ca   Sees  Nahid and is on lupron shot .  Because of side effects it was discussed with him by Dr. Bui to switch to relugolix and he apparently is approved with some $50-$60 co-pay per month.  He had a CT scan of the chest recently and that has remained stable without any evidence of recurrence.    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      REVIEW OF SYSTEMS:    Psychosocial assessment was obtained. Intervention was  Not  necessary.    ECOG Performance Status: 0    Pain Scale: 0    Treatment Related Toxicites: none      Review of Systems   Constitutional: Negative.    HENT: Negative.    Eyes: Negative.    Respiratory: Negative.    Cardiovascular: Positive for chest pain.   Endocrine: Negative.    Genitourinary: Negative.    Allergic/Immunologic: Negative.    Neurological: Negative.    Hematological: Negative.    Psychiatric/Behavioral: Positive for sleep disturbance.         Vitals:  Visit Vitals  /74   Pulse 66   Temp 98.2 °F (36.8 °C) (Oral)   Resp 16   Ht 5' 9\" (1.753 m)   Wt 82.3 kg (181 lb 7 oz)   SpO2 100%   BMI 26.79 kg/m²     Body surface area is 1.98 meters squared.      Physical Exam  Vitals signs reviewed.   Constitutional:       Appearance: He is well-developed.   HENT:      Head: Normocephalic.   Neck:      Musculoskeletal: Normal range of motion.      Thyroid: No thyromegaly.      Vascular: No JVD.      Trachea: No tracheal deviation.   Musculoskeletal: Normal range of motion.   Neurological:      Mental Status: He is alert and oriented to person, place, and time.   Psychiatric:         Mood and Affect: Mood normal.         Behavior: Behavior normal.         Thought Content: Thought content normal.         Judgment: Judgment  normal.     LABS:    WBC (K/mcL)   Date Value   02/14/2023 6.3     HCT (%)   Date Value   02/14/2023 36.0 (L)     HGB (g/dL)   Date Value   02/14/2023 11.7 (L)       PLT (K/mcL)   Date Value   02/14/2023 257     Glucose (mg/dL)   Date Value   04/20/2023 104 (H)     Sodium (mmol/L)   Date Value   04/20/2023 137     Potassium (mmol/L)   Date Value   04/20/2023 4.6     Chloride (mmol/L)   Date Value   04/20/2023 106     Carbon Dioxide (mmol/L)   Date Value   04/20/2023 26     BUN (mg/dL)   Date Value   04/20/2023 13     Creatinine (mg/dL)   Date Value   04/20/2023 1.07     Protein, Total (g/dL)   Date Value   04/20/2023 7.3     Albumin (g/dL)   Date Value   04/20/2023 3.7     Calcium (mg/dL)   Date Value   04/20/2023 9.0     Bilirubin, Total (mg/dL)   Date Value   04/20/2023 0.4     GOT/AST (Units/L)   Date Value   04/20/2023 21     Alkaline Phosphatase (Units/L)   Date Value   04/20/2023 50     GPT/ALT (Units/L)   Date Value   04/20/2023 21     Anion Gap (mmol/L)   Date Value   04/20/2023 10     BUN/Creatinine Ratio (no units)   Date Value   10/12/2020 13     Globulin (g/dL)   Date Value   04/20/2023 3.6     A/G Ratio (no units)   Date Value   04/20/2023 1.0     GFR Estimate, Non  (no units)   Date Value   10/12/2020 63     GFR Estimate,  (no units)   Date Value   10/12/2020 74        Ref Range & Units 13 d ago 1 mo ago 4 mo ago 7 mo ago 8 mo ago   Prostate Specific Antigen <=4.00 ng/mL <0.01  <0.01 CM  <0.01 CM  <0.01 R, CM  <0.01 R, CM    Comment: Suggested age specific reference ranges:      IMPRESSION:ct chest  6/24/22     1.    No evidence of metastatic disease in the chest.       2.    Stable scarring in the right upper lobe and right lower lobe.     Electronically Signed by: KEIKO COLON M.D.   Signed on: 6/26/2022 10:40 PM         IMPRESSION:ct chest   1/20/22   No evidence of residual, recurrent or metastatic disease in the chest.       IMPRESSION:1/6/21      Linear regions  of scarring in the right lung, similar to prior exam.     IMPRESSION: ct  Chest 10/20/20  Wall thickening of the stomach is seen.  Etiology of this is not clear.  Correlate with endoscopy.       There are also curvilinear opacities in the posterior aspect of the right upper lobe and the superior segment of the right lower lobe adjacent to the major fissure.  Mild thickening of the major fissure is also noted.  Follow-up is advised in 3 months.           IMPRESSION: ct  Chest  11/19/19  1.  There is a new airspace opacity within the right lower lobe with associated bronchiectasis   consistent with pneumonia.  2.  Additional findings within the right upper lobe are improved and the current findings are   consistent with pulmonary scarring.  3.  6 mm nodule at the left lung base appears pleural-based and is likely benign     IMPRESSION: ct  Chest  11/5/19     1.  Apparent interval regression of coalescent small pleural-based anterior segmental right upper   lobe (superior segmental right lower lobe nodules.     2.  Residual non-calcified 6 mm lingular nodule, unchanged; a follow-up examination in 6 months   is suggested.     3.  Non-obstructing left upper pole renal calculus.        4.  Other findings, as noted.      ASSESSMENT/PLAN:  Prostate  Ca  Stage 4    Initial dx in 2008  S/p robotic assisted  Prostatectomy    lung mets s/p SBRT  4/2019   With  Lung  Met solitary  By bx   started on lupron 3/2019  Tried xtandi with lupron   xtandi discontinue ed sec to s/e  Ct scan stable MADHURI 1/2022 2022 off lupron sec to s/e        Plan      6/15/23  D/w the pt  to get psa today  and advised to  Stay off lupron and xtandi and monitor closely  Needs to f/u with his pcp and urology  10/2/23  D/w the pt the chest discomfort looks musculoskeletal  but can get a ct chest for f/u of his lung met s/p SBRT  Advised to f/u with  Dr bui  Advised to get labs today     ORDERS:  Orders Placed This Encounter   • CT CHEST W CONTRAST   •  PSA   • Comprehensive Metabolic Panel         FOLLOW UP:  Return in about 3 months (around 1/2/2024).      Learning needs assessed. Learning intervention  WAS NOT required.    Above plan was discussed with patient and family and all pertinent questions were answered. At the end of the evaluation, the patient was asked if all complaints had been addressed today to his satisfaction and he responded affirmatively.      Thank you for allowing me to participate in your patient's healthcare management. Please feel free to contact me with any questions.    Sincerely,    Melissa Rodrigez MD           Yes - the patient is able to be screened

## 2023-11-29 NOTE — ED PROVIDER NOTE - RELATIONSHIP TO PATIENT
Tracy Parra presents to Urgent Care Patient arriving with: with BOY FRIEND Pat with complaint of   Chest tightness over 2 weeks on/off  Sternum feels tender when she touch on it.    Shortness of breath:  Pending /moving around makes the shortness of the breath worse.    Nausea     No coughing        Can leave detailed message on   mobile phone:    Mobile [phone]      Sister

## 2024-01-31 NOTE — OCCUPATIONAL THERAPY INITIAL EVALUATION ADULT - LEVEL OF INDEPENDENCE: STAND/SIT, REHAB EVAL
Detail Level: Generalized Detail Level: Zone Detail Level: Simple minimum assist (75% patients effort)

## 2024-03-19 NOTE — OCCUPATIONAL THERAPY INITIAL EVALUATION ADULT - TRANSFER TRAINING, PT EVAL
Alert and oriented to person, place and time
1: bed <> chair independently with AD as needed within 4 weeks, 2: tub transfer independently with AE as needed within 4 weeks

## 2024-05-04 ENCOUNTER — EMERGENCY (EMERGENCY)
Facility: HOSPITAL | Age: 79
LOS: 1 days | Discharge: HOME | End: 2024-05-04
Attending: EMERGENCY MEDICINE
Payer: MEDICARE

## 2024-05-04 VITALS
DIASTOLIC BLOOD PRESSURE: 75 MMHG | OXYGEN SATURATION: 93 % | RESPIRATION RATE: 20 BRPM | SYSTOLIC BLOOD PRESSURE: 154 MMHG | HEART RATE: 111 BPM | TEMPERATURE: 101 F

## 2024-05-04 VITALS
HEIGHT: 60 IN | OXYGEN SATURATION: 95 % | TEMPERATURE: 100 F | WEIGHT: 214.95 LBS | HEART RATE: 115 BPM | DIASTOLIC BLOOD PRESSURE: 85 MMHG | RESPIRATION RATE: 20 BRPM | SYSTOLIC BLOOD PRESSURE: 135 MMHG

## 2024-05-04 DIAGNOSIS — Z98.890 OTHER SPECIFIED POSTPROCEDURAL STATES: Chronic | ICD-10-CM

## 2024-05-04 DIAGNOSIS — Z96.651 PRESENCE OF RIGHT ARTIFICIAL KNEE JOINT: Chronic | ICD-10-CM

## 2024-05-04 LAB
ALBUMIN SERPL ELPH-MCNC: 4.1 G/DL — SIGNIFICANT CHANGE UP (ref 3.3–5)
ALP SERPL-CCNC: 72 U/L — SIGNIFICANT CHANGE UP (ref 40–120)
ALT FLD-CCNC: 15 U/L — SIGNIFICANT CHANGE UP (ref 10–45)
ANION GAP SERPL CALC-SCNC: 12 MMOL/L — SIGNIFICANT CHANGE UP (ref 5–17)
APPEARANCE UR: ABNORMAL
APTT BLD: 29.9 SEC — SIGNIFICANT CHANGE UP (ref 24.5–35.6)
AST SERPL-CCNC: 15 U/L — SIGNIFICANT CHANGE UP (ref 10–40)
BACTERIA # UR AUTO: ABNORMAL /HPF
BASE EXCESS BLDV CALC-SCNC: 3 MMOL/L — SIGNIFICANT CHANGE UP (ref -2–3)
BASOPHILS # BLD AUTO: 0.03 K/UL — SIGNIFICANT CHANGE UP (ref 0–0.2)
BASOPHILS NFR BLD AUTO: 0.3 % — SIGNIFICANT CHANGE UP (ref 0–2)
BILIRUB SERPL-MCNC: 0.8 MG/DL — SIGNIFICANT CHANGE UP (ref 0.2–1.2)
BILIRUB UR-MCNC: NEGATIVE — SIGNIFICANT CHANGE UP
BUN SERPL-MCNC: 13 MG/DL — SIGNIFICANT CHANGE UP (ref 7–23)
CA-I SERPL-SCNC: 1.18 MMOL/L — SIGNIFICANT CHANGE UP (ref 1.15–1.33)
CALCIUM SERPL-MCNC: 9.4 MG/DL — SIGNIFICANT CHANGE UP (ref 8.4–10.5)
CAST: 2 /LPF — SIGNIFICANT CHANGE UP (ref 0–4)
CHLORIDE BLDV-SCNC: 100 MMOL/L — SIGNIFICANT CHANGE UP (ref 96–108)
CHLORIDE SERPL-SCNC: 101 MMOL/L — SIGNIFICANT CHANGE UP (ref 96–108)
CO2 BLDV-SCNC: 30 MMOL/L — HIGH (ref 22–26)
CO2 SERPL-SCNC: 25 MMOL/L — SIGNIFICANT CHANGE UP (ref 22–31)
COLOR SPEC: YELLOW — SIGNIFICANT CHANGE UP
CREAT SERPL-MCNC: 0.79 MG/DL — SIGNIFICANT CHANGE UP (ref 0.5–1.3)
DIFF PNL FLD: ABNORMAL
EGFR: 77 ML/MIN/1.73M2 — SIGNIFICANT CHANGE UP
EOSINOPHIL # BLD AUTO: 0 K/UL — SIGNIFICANT CHANGE UP (ref 0–0.5)
EOSINOPHIL NFR BLD AUTO: 0 % — SIGNIFICANT CHANGE UP (ref 0–6)
FLUAV AG NPH QL: SIGNIFICANT CHANGE UP
FLUBV AG NPH QL: SIGNIFICANT CHANGE UP
GAS PNL BLDV: 134 MMOL/L — LOW (ref 136–145)
GAS PNL BLDV: SIGNIFICANT CHANGE UP
GLUCOSE BLDV-MCNC: 119 MG/DL — HIGH (ref 70–99)
GLUCOSE SERPL-MCNC: 143 MG/DL — HIGH (ref 70–99)
GLUCOSE UR QL: NEGATIVE MG/DL — SIGNIFICANT CHANGE UP
HCO3 BLDV-SCNC: 29 MMOL/L — SIGNIFICANT CHANGE UP (ref 22–29)
HCT VFR BLD CALC: 40.2 % — SIGNIFICANT CHANGE UP (ref 34.5–45)
HCT VFR BLDA CALC: 41 % — SIGNIFICANT CHANGE UP (ref 34.5–46.5)
HGB BLD CALC-MCNC: 13.6 G/DL — SIGNIFICANT CHANGE UP (ref 11.7–16.1)
HGB BLD-MCNC: 12.8 G/DL — SIGNIFICANT CHANGE UP (ref 11.5–15.5)
IMM GRANULOCYTES NFR BLD AUTO: 0.4 % — SIGNIFICANT CHANGE UP (ref 0–0.9)
INR BLD: 1.2 RATIO — HIGH (ref 0.85–1.18)
KETONES UR-MCNC: NEGATIVE MG/DL — SIGNIFICANT CHANGE UP
LACTATE BLDV-MCNC: 2.2 MMOL/L — HIGH (ref 0.5–2)
LEUKOCYTE ESTERASE UR-ACNC: ABNORMAL
LIDOCAIN IGE QN: 19 U/L — SIGNIFICANT CHANGE UP (ref 7–60)
LYMPHOCYTES # BLD AUTO: 0.54 K/UL — LOW (ref 1–3.3)
LYMPHOCYTES # BLD AUTO: 4.8 % — LOW (ref 13–44)
MCHC RBC-ENTMCNC: 30.2 PG — SIGNIFICANT CHANGE UP (ref 27–34)
MCHC RBC-ENTMCNC: 31.8 GM/DL — LOW (ref 32–36)
MCV RBC AUTO: 94.8 FL — SIGNIFICANT CHANGE UP (ref 80–100)
MONOCYTES # BLD AUTO: 0.74 K/UL — SIGNIFICANT CHANGE UP (ref 0–0.9)
MONOCYTES NFR BLD AUTO: 6.6 % — SIGNIFICANT CHANGE UP (ref 2–14)
NEUTROPHILS # BLD AUTO: 9.8 K/UL — HIGH (ref 1.8–7.4)
NEUTROPHILS NFR BLD AUTO: 87.9 % — HIGH (ref 43–77)
NITRITE UR-MCNC: POSITIVE
NRBC # BLD: 0 /100 WBCS — SIGNIFICANT CHANGE UP (ref 0–0)
PCO2 BLDV: 49 MMHG — HIGH (ref 39–42)
PH BLDV: 7.38 — SIGNIFICANT CHANGE UP (ref 7.32–7.43)
PH UR: 6 — SIGNIFICANT CHANGE UP (ref 5–8)
PLATELET # BLD AUTO: 225 K/UL — SIGNIFICANT CHANGE UP (ref 150–400)
PO2 BLDV: 24 MMHG — LOW (ref 25–45)
POTASSIUM BLDV-SCNC: 3.9 MMOL/L — SIGNIFICANT CHANGE UP (ref 3.5–5.1)
POTASSIUM SERPL-MCNC: 3.9 MMOL/L — SIGNIFICANT CHANGE UP (ref 3.5–5.3)
POTASSIUM SERPL-SCNC: 3.9 MMOL/L — SIGNIFICANT CHANGE UP (ref 3.5–5.3)
PROT SERPL-MCNC: 7.6 G/DL — SIGNIFICANT CHANGE UP (ref 6–8.3)
PROT UR-MCNC: 30 MG/DL
PROTHROM AB SERPL-ACNC: 12.5 SEC — SIGNIFICANT CHANGE UP (ref 9.5–13)
RBC # BLD: 4.24 M/UL — SIGNIFICANT CHANGE UP (ref 3.8–5.2)
RBC # FLD: 13.3 % — SIGNIFICANT CHANGE UP (ref 10.3–14.5)
RBC CASTS # UR COMP ASSIST: 8 /HPF — HIGH (ref 0–4)
RSV RNA NPH QL NAA+NON-PROBE: SIGNIFICANT CHANGE UP
SAO2 % BLDV: 31 % — LOW (ref 67–88)
SARS-COV-2 RNA SPEC QL NAA+PROBE: SIGNIFICANT CHANGE UP
SODIUM SERPL-SCNC: 138 MMOL/L — SIGNIFICANT CHANGE UP (ref 135–145)
SP GR SPEC: 1.02 — SIGNIFICANT CHANGE UP (ref 1–1.03)
SQUAMOUS # UR AUTO: 15 /HPF — HIGH (ref 0–5)
UROBILINOGEN FLD QL: 1 MG/DL — SIGNIFICANT CHANGE UP (ref 0.2–1)
WBC # BLD: 11.16 K/UL — HIGH (ref 3.8–10.5)
WBC # FLD AUTO: 11.16 K/UL — HIGH (ref 3.8–10.5)
WBC UR QL: 146 /HPF — HIGH (ref 0–5)

## 2024-05-04 PROCEDURE — 82330 ASSAY OF CALCIUM: CPT

## 2024-05-04 PROCEDURE — 85730 THROMBOPLASTIN TIME PARTIAL: CPT

## 2024-05-04 PROCEDURE — 99285 EMERGENCY DEPT VISIT HI MDM: CPT | Mod: 25

## 2024-05-04 PROCEDURE — 96374 THER/PROPH/DIAG INJ IV PUSH: CPT | Mod: XU

## 2024-05-04 PROCEDURE — 80053 COMPREHEN METABOLIC PANEL: CPT

## 2024-05-04 PROCEDURE — 85018 HEMOGLOBIN: CPT

## 2024-05-04 PROCEDURE — 99285 EMERGENCY DEPT VISIT HI MDM: CPT | Mod: FS

## 2024-05-04 PROCEDURE — 87637 SARSCOV2&INF A&B&RSV AMP PRB: CPT

## 2024-05-04 PROCEDURE — 83605 ASSAY OF LACTIC ACID: CPT

## 2024-05-04 PROCEDURE — 84295 ASSAY OF SERUM SODIUM: CPT

## 2024-05-04 PROCEDURE — 87040 BLOOD CULTURE FOR BACTERIA: CPT

## 2024-05-04 PROCEDURE — 85014 HEMATOCRIT: CPT

## 2024-05-04 PROCEDURE — 85025 COMPLETE CBC W/AUTO DIFF WBC: CPT

## 2024-05-04 PROCEDURE — 82435 ASSAY OF BLOOD CHLORIDE: CPT

## 2024-05-04 PROCEDURE — 82803 BLOOD GASES ANY COMBINATION: CPT

## 2024-05-04 PROCEDURE — 87186 SC STD MICRODIL/AGAR DIL: CPT

## 2024-05-04 PROCEDURE — 87086 URINE CULTURE/COLONY COUNT: CPT

## 2024-05-04 PROCEDURE — 71045 X-RAY EXAM CHEST 1 VIEW: CPT | Mod: 26

## 2024-05-04 PROCEDURE — 84132 ASSAY OF SERUM POTASSIUM: CPT

## 2024-05-04 PROCEDURE — 74177 CT ABD & PELVIS W/CONTRAST: CPT | Mod: MC

## 2024-05-04 PROCEDURE — 71045 X-RAY EXAM CHEST 1 VIEW: CPT

## 2024-05-04 PROCEDURE — 96375 TX/PRO/DX INJ NEW DRUG ADDON: CPT

## 2024-05-04 PROCEDURE — 82947 ASSAY GLUCOSE BLOOD QUANT: CPT

## 2024-05-04 PROCEDURE — 83690 ASSAY OF LIPASE: CPT

## 2024-05-04 PROCEDURE — 93005 ELECTROCARDIOGRAM TRACING: CPT

## 2024-05-04 PROCEDURE — 81001 URINALYSIS AUTO W/SCOPE: CPT

## 2024-05-04 PROCEDURE — 74177 CT ABD & PELVIS W/CONTRAST: CPT | Mod: 26,MC

## 2024-05-04 PROCEDURE — 93010 ELECTROCARDIOGRAM REPORT: CPT

## 2024-05-04 PROCEDURE — 85610 PROTHROMBIN TIME: CPT

## 2024-05-04 PROCEDURE — 36415 COLL VENOUS BLD VENIPUNCTURE: CPT

## 2024-05-04 RX ORDER — CEFPODOXIME PROXETIL 100 MG
1 TABLET ORAL
Qty: 20 | Refills: 0
Start: 2024-05-04 | End: 2024-05-13

## 2024-05-04 RX ORDER — ONDANSETRON 8 MG/1
1 TABLET, FILM COATED ORAL
Qty: 20 | Refills: 0
Start: 2024-05-04 | End: 2024-05-08

## 2024-05-04 RX ORDER — CEFTRIAXONE 500 MG/1
1000 INJECTION, POWDER, FOR SOLUTION INTRAMUSCULAR; INTRAVENOUS ONCE
Refills: 0 | Status: COMPLETED | OUTPATIENT
Start: 2024-05-04 | End: 2024-05-04

## 2024-05-04 RX ORDER — ONDANSETRON 8 MG/1
4 TABLET, FILM COATED ORAL ONCE
Refills: 0 | Status: COMPLETED | OUTPATIENT
Start: 2024-05-04 | End: 2024-05-04

## 2024-05-04 RX ORDER — KETOROLAC TROMETHAMINE 30 MG/ML
15 SYRINGE (ML) INJECTION ONCE
Refills: 0 | Status: DISCONTINUED | OUTPATIENT
Start: 2024-05-04 | End: 2024-05-04

## 2024-05-04 RX ORDER — ACETAMINOPHEN 500 MG
975 TABLET ORAL ONCE
Refills: 0 | Status: COMPLETED | OUTPATIENT
Start: 2024-05-04 | End: 2024-05-04

## 2024-05-04 RX ORDER — SODIUM CHLORIDE 9 MG/ML
1000 INJECTION INTRAMUSCULAR; INTRAVENOUS; SUBCUTANEOUS ONCE
Refills: 0 | Status: COMPLETED | OUTPATIENT
Start: 2024-05-04 | End: 2024-05-04

## 2024-05-04 RX ADMIN — CEFTRIAXONE 100 MILLIGRAM(S): 500 INJECTION, POWDER, FOR SOLUTION INTRAMUSCULAR; INTRAVENOUS at 11:28

## 2024-05-04 RX ADMIN — Medication 975 MILLIGRAM(S): at 14:43

## 2024-05-04 RX ADMIN — ONDANSETRON 4 MILLIGRAM(S): 8 TABLET, FILM COATED ORAL at 13:36

## 2024-05-04 RX ADMIN — Medication 15 MILLIGRAM(S): at 11:29

## 2024-05-04 RX ADMIN — SODIUM CHLORIDE 1000 MILLILITER(S): 9 INJECTION INTRAMUSCULAR; INTRAVENOUS; SUBCUTANEOUS at 11:29

## 2024-05-04 NOTE — ED PROVIDER NOTE - PROGRESS NOTE DETAILS
Lab: [id number] Lab: [id number] Patient with Pyelo on CT supported by urinalysis.  Mild leukocytosis mild elevation in lactate.  Patient feeling much better after IV antibiotics and IV fluids.  Offered admission to the hospital for continued hydration and IV antibiotics.  Patient declined wishes to go home on oral antibiotics and will follow-up with primary.  Discussed return precautions will give cefpodoxime Zofran recommend Tylenol Motrin as needed for fevers and pains.  Mic Pt ate an entire patient tray, feels well.  Pt still has fever and a little tachy cardia, wants Tylenol and to be discharged does not want to wait for repeat vitals.  Discussed DC plan, return precautions and need for close follow up with both her and her sister,. they are comfortable going home and would like to leave.  Mic

## 2024-05-04 NOTE — ED PROVIDER NOTE - OBJECTIVE STATEMENT
78-year-old female history of hyperlipidemia presenting to the emergency department with 2 days of fever chills and right-sided flank pain.  Patient endorsing that urine has become cloudy but denies dysuria or frequency.  No gross hematuria.  Several episodes of nonbilious nonbloody vomiting but no diarrhea.  Has not taken anything for pain nothing makes it better or worse.

## 2024-05-04 NOTE — ED ADULT NURSE NOTE - OBJECTIVE STATEMENT
1100 pt 78yf aox4 self care ambulatory Khmer spkg w/ sister at bedside c/o abd pain fever since Wednesday, 5/1/24 able to verbalize concerns, states she also took meds from [facility] that takes care of fever and abd pain, but w/o relief, pt vitals wnl, in no acute distress pending eval

## 2024-05-04 NOTE — ED PROVIDER NOTE - NSFOLLOWUPINSTRUCTIONS_ED_ALL_ED_FT
Cefpodoxime 200 mg every 12 hours for 10 days  Zofran 4 mg every 6 hours as needed for nausea  Tylenol as needed for fever control do not take more than 4 g in a 24-hour period  Ibuprofen as needed for pain consult package insert for dosing information  Call your doctor today to make a follow-up appointment you should be seen early next week  If at any point you feel worse you fevers or not well-controlled you have any vomiting are not able to take her medicine or any new or worsening concerns please come back to the emergency room immediately

## 2024-05-04 NOTE — ED PROVIDER NOTE - NSFOLLOWUPCLINICS_GEN_ALL_ED_FT
[facility] General Internal Medicine  General Internal Medicine  [address] [place] [address]  Phone: [phone]  Fax:

## 2024-05-04 NOTE — ED PROVIDER NOTE - PATIENT PORTAL LINK FT
You can access the FollowMyHealth Patient Portal offered by [facility] by registering at the following website: [email or web address]. By joining [facility]’s FollowMyHealth portal, you will also be able to view your health information using other applications (apps) compatible with our system.

## 2024-05-04 NOTE — ED PROVIDER NOTE - ENMT, MLM
Airway patent, Nasal mucosa clear. Mouth with normal mucosa. Throat has no vesicles, no oropharyngeal exudates and uvula is midline.
IV discontinued, cath removed intact

## 2024-05-04 NOTE — ED PROVIDER NOTE - CLINICAL SUMMARY MEDICAL DECISION MAKING FREE TEXT BOX
Attendinyo female presents with fever and chills associated with cloudy urine.  had vomiting x 1.  could be pyelonephritis however there is abdominal pain that could be another intraabdominal inflammatory process.  will get CT abdomen and pelvis to evaluate and reassess.

## 2024-05-04 NOTE — ED ADULT NURSE NOTE - NSFALLHARMRISKINTERV_ED_ALL_ED

## 2024-05-04 NOTE — ED PROVIDER NOTE - NSICDXPASTSURGICALHX_GEN_ALL_CORE_FT
PAST SURGICAL HISTORY:  S/P carpal tunnel release '90's  Bilateral, left hand redone 2020    S/P colonoscopy 4/2019     Negative    S/P endoscopy '2019    S/P total knee replacement, right      Statement Selected

## 2024-05-04 NOTE — ED ADULT TRIAGE NOTE - WEIGHT IN KG
"Chief Complaint   Patient presents with     Establish Care     Blood Draw     Smoking Cessation     Refill Request     refill all medication       Initial BP (!) 155/99 (BP Location: Right arm, Patient Position: Chair, Cuff Size: Adult Regular)  Pulse 81  Temp 99.1  F (37.3  C) (Tympanic)  Ht 6' 1.5\" (1.867 m)  Wt (!) 540 lb 8 oz (245.2 kg)  SpO2 93%  BMI 70.34 kg/m2 Estimated body mass index is 70.34 kg/(m^2) as calculated from the following:    Height as of this encounter: 6' 1.5\" (1.867 m).    Weight as of this encounter: 540 lb 8 oz (245.2 kg).  Medication Reconciliation: complete   Lilian CABRERA CMA ([facility])    "
97.5

## 2024-05-07 LAB
-  AMOXICILLIN/CLAVULANIC ACID: SIGNIFICANT CHANGE UP
-  AMPICILLIN/SULBACTAM: SIGNIFICANT CHANGE UP
-  AMPICILLIN: SIGNIFICANT CHANGE UP
-  AZTREONAM: SIGNIFICANT CHANGE UP
-  CEFAZOLIN: SIGNIFICANT CHANGE UP
-  CEFEPIME: SIGNIFICANT CHANGE UP
-  CEFOXITIN: SIGNIFICANT CHANGE UP
-  CEFTRIAXONE: SIGNIFICANT CHANGE UP
-  CEFUROXIME: SIGNIFICANT CHANGE UP
-  CIPROFLOXACIN: SIGNIFICANT CHANGE UP
-  ERTAPENEM: SIGNIFICANT CHANGE UP
-  GENTAMICIN: SIGNIFICANT CHANGE UP
-  IMIPENEM: SIGNIFICANT CHANGE UP
-  LEVOFLOXACIN: SIGNIFICANT CHANGE UP
-  MEROPENEM: SIGNIFICANT CHANGE UP
-  NITROFURANTOIN: SIGNIFICANT CHANGE UP
-  PIPERACILLIN/TAZOBACTAM: SIGNIFICANT CHANGE UP
-  TOBRAMYCIN: SIGNIFICANT CHANGE UP
-  TRIMETHOPRIM/SULFAMETHOXAZOLE: SIGNIFICANT CHANGE UP
CULTURE RESULTS: ABNORMAL
METHOD TYPE: SIGNIFICANT CHANGE UP
ORGANISM # SPEC MICROSCOPIC CNT: ABNORMAL
ORGANISM # SPEC MICROSCOPIC CNT: ABNORMAL
SPECIMEN SOURCE: SIGNIFICANT CHANGE UP

## 2024-05-09 LAB
CULTURE RESULTS: SIGNIFICANT CHANGE UP
CULTURE RESULTS: SIGNIFICANT CHANGE UP
SPECIMEN SOURCE: SIGNIFICANT CHANGE UP
SPECIMEN SOURCE: SIGNIFICANT CHANGE UP

## 2024-05-17 ENCOUNTER — APPOINTMENT (OUTPATIENT)
Dept: ULTRASOUND IMAGING | Facility: IMAGING CENTER | Age: 79
End: 2024-05-17
Payer: MEDICARE

## 2024-05-17 ENCOUNTER — APPOINTMENT (OUTPATIENT)
Dept: MAMMOGRAPHY | Facility: IMAGING CENTER | Age: 79
End: 2024-05-17
Payer: MEDICARE

## 2024-05-17 ENCOUNTER — OUTPATIENT (OUTPATIENT)
Dept: OUTPATIENT SERVICES | Facility: HOSPITAL | Age: 79
LOS: 1 days | End: 2024-05-17
Payer: MEDICARE

## 2024-05-17 DIAGNOSIS — Z00.8 ENCOUNTER FOR OTHER GENERAL EXAMINATION: ICD-10-CM

## 2024-05-17 DIAGNOSIS — Z98.890 OTHER SPECIFIED POSTPROCEDURAL STATES: Chronic | ICD-10-CM

## 2024-05-17 DIAGNOSIS — Z96.651 PRESENCE OF RIGHT ARTIFICIAL KNEE JOINT: Chronic | ICD-10-CM

## 2024-05-17 PROCEDURE — 77067 SCR MAMMO BI INCL CAD: CPT | Mod: 26

## 2024-05-17 PROCEDURE — 76641 ULTRASOUND BREAST COMPLETE: CPT

## 2024-05-17 PROCEDURE — 77067 SCR MAMMO BI INCL CAD: CPT

## 2024-05-17 PROCEDURE — 76641 ULTRASOUND BREAST COMPLETE: CPT | Mod: 26,50,GY

## 2024-07-30 NOTE — PHYSICAL THERAPY INITIAL EVALUATION ADULT - PHYSICAL ASSIST/NONPHYSICAL ASSIST: SIT/STAND, REHAB EVAL
I spoke with pharmacy and pt already picked up script for pantoprazole. They stated pt did not need PA. I informed PRIOR auth dept . Updated on tracker   
verbal cues/1 person assist

## 2024-12-17 NOTE — OCCUPATIONAL THERAPY INITIAL EVALUATION ADULT - LEVEL OF INDEPENDENCE: SIT/STAND, REHAB EVAL
PA response received: [facility] identified an error with your request. Request Reference Number: [id number].  IMIQUIMOD    CRE 3.75% is approved through 12/03/2025.  Your patient may now fill this prescription and it will be covered.. Please reach out to the payer for assistance.     PA closed.   contact guard

## 2025-05-25 NOTE — ED PROVIDER NOTE - WR INTERPRETATION 1
Primary RN spoke with staff from [facility]; staff states they will have someone bring over birth control before she leaves for [place] at 1230     Antonio Damon RN  05/25/25 [id number]     CXR negative - No infiltrates, No consolidation, No atelectasis seen